# Patient Record
Sex: FEMALE | Race: WHITE | NOT HISPANIC OR LATINO | Employment: FULL TIME | URBAN - METROPOLITAN AREA
[De-identification: names, ages, dates, MRNs, and addresses within clinical notes are randomized per-mention and may not be internally consistent; named-entity substitution may affect disease eponyms.]

---

## 2017-03-29 ENCOUNTER — ALLSCRIPTS OFFICE VISIT (OUTPATIENT)
Dept: OTHER | Facility: OTHER | Age: 27
End: 2017-03-29

## 2017-03-29 DIAGNOSIS — R68.82 DECREASED LIBIDO: ICD-10-CM

## 2017-03-29 DIAGNOSIS — N76.0 ACUTE VAGINITIS: ICD-10-CM

## 2017-04-03 ENCOUNTER — HOSPITAL ENCOUNTER (OUTPATIENT)
Dept: RADIOLOGY | Facility: HOSPITAL | Age: 27
Discharge: HOME | End: 2017-04-03
Attending: OBSTETRICS & GYNECOLOGY
Payer: COMMERCIAL

## 2017-04-03 DIAGNOSIS — N94.10 DYSPAREUNIA IN FEMALE: ICD-10-CM

## 2017-04-03 DIAGNOSIS — R68.82 DECREASED LIBIDO: ICD-10-CM

## 2017-04-03 PROCEDURE — 76830 TRANSVAGINAL US NON-OB: CPT

## 2017-04-03 PROCEDURE — 76856 US EXAM PELVIC COMPLETE: CPT

## 2017-04-12 ENCOUNTER — ALLSCRIPTS OFFICE VISIT (OUTPATIENT)
Dept: OTHER | Facility: OTHER | Age: 27
End: 2017-04-12

## 2017-05-09 ENCOUNTER — ALLSCRIPTS OFFICE VISIT (OUTPATIENT)
Dept: OTHER | Facility: OTHER | Age: 27
End: 2017-05-09

## 2017-05-09 PROCEDURE — 87491 CHLMYD TRACH DNA AMP PROBE: CPT | Performed by: OBSTETRICS & GYNECOLOGY

## 2017-05-09 PROCEDURE — 87591 N.GONORRHOEAE DNA AMP PROB: CPT | Performed by: OBSTETRICS & GYNECOLOGY

## 2017-05-09 PROCEDURE — G0145 SCR C/V CYTO,THINLAYER,RESCR: HCPCS | Performed by: OBSTETRICS & GYNECOLOGY

## 2017-05-11 ENCOUNTER — LAB REQUISITION (OUTPATIENT)
Dept: LAB | Facility: HOSPITAL | Age: 27
End: 2017-05-11
Payer: COMMERCIAL

## 2017-05-11 DIAGNOSIS — Z12.4 ENCOUNTER FOR SCREENING FOR MALIGNANT NEOPLASM OF CERVIX: ICD-10-CM

## 2017-05-11 DIAGNOSIS — Z11.3 ENCOUNTER FOR SCREENING FOR INFECTIONS WITH PREDOMINANTLY SEXUAL MODE OF TRANSMISSION: ICD-10-CM

## 2017-05-19 LAB
CHLAMYDIA DNA CVX QL NAA+PROBE: NORMAL
N GONORRHOEA DNA GENITAL QL NAA+PROBE: NORMAL

## 2017-05-24 LAB
LAB AP GYN PRIMARY INTERPRETATION: NORMAL
LAB AP LMP: NORMAL
Lab: NORMAL

## 2017-08-08 ENCOUNTER — ALLSCRIPTS OFFICE VISIT (OUTPATIENT)
Dept: OTHER | Facility: OTHER | Age: 27
End: 2017-08-08

## 2017-09-07 ENCOUNTER — GENERIC CONVERSION - ENCOUNTER (OUTPATIENT)
Dept: OTHER | Facility: OTHER | Age: 27
End: 2017-09-07

## 2017-11-09 ENCOUNTER — ALLSCRIPTS OFFICE VISIT (OUTPATIENT)
Dept: OTHER | Facility: OTHER | Age: 27
End: 2017-11-09

## 2017-11-09 PROCEDURE — 87491 CHLMYD TRACH DNA AMP PROBE: CPT | Performed by: OBSTETRICS & GYNECOLOGY

## 2017-11-09 PROCEDURE — 87591 N.GONORRHOEAE DNA AMP PROB: CPT | Performed by: OBSTETRICS & GYNECOLOGY

## 2017-11-10 ENCOUNTER — LAB REQUISITION (OUTPATIENT)
Dept: LAB | Facility: HOSPITAL | Age: 27
End: 2017-11-10
Payer: COMMERCIAL

## 2017-11-10 DIAGNOSIS — Z11.3 ENCOUNTER FOR SCREENING FOR INFECTIONS WITH PREDOMINANTLY SEXUAL MODE OF TRANSMISSION: ICD-10-CM

## 2017-11-10 NOTE — PROGRESS NOTES
Assessment  1  Bacterial vaginosis (616 10,041 9) (N76 0,B96 89)   2  Screening for STD (sexually transmitted disease) (V74 5) (Z11 3)    Plan  Bacterial vaginosis    · MetroNIDAZOLE 500 MG Oral Tablet   Rx By: Roslyn Feliz; Dispense: 0 Days ; #:1 X 20 Tablet Bottle; Refill: 0;Bacterial vaginosis; KENISHA = N; Sent To: [facility] [id number]   · Cleocin 100 MG Vaginal Suppository; INSERT 1 SUPPOSITORYINTRAVAGINALLY AT BEDTIME NIGHTLY   Rx By: Roslyn Feliz; Dispense: 3 Days ; #:1 X 3 Suppository Box; Refill: 1;Bacterial vaginosis; KENISHA = N; Verified Transmission to [address] [place] [address]; Last Updated By: System, [employer]; 11/9/2017 5:04:34 PM  Screening for STD (sexually transmitted disease)    · (1) CHLAMYDIA/GC AMPLIFIED DNA, PCR; Source:Endocervical; Status:Active; Requested ABP:[id number];    Perform:[facility]; UFU:[id number]; Ordered;for STD (sexually transmitted disease); Ordered By:Riedel, Selestino [place]ers;    Discussion/Summary  Discussion Summary:   As noted above patient with recurrent bacterial vaginosis symptoms request repeat treatment with Cleocin vaginal suppositories  Wet mount positive for recurrent bacterial vaginosis  No other symptoms STD testing for GC and Chlamydia also done today  Patient declines any further laboratory studies for STDs and have been previously negative in the past    Counseling Documentation With Imm: The patient was counseled regarding diagnostic results,-- instructions for management,-- risk factor reductions,-- prognosis,-- impressions,-- risks and benefits of treatment options,-- importance of compliance with treatment,-- Recurrent bacterial vaginosis, STD screening  total time of encounter was 25 minutes-- and-- 15 minutes was spent counseling  Chief Complaint  Chief Complaint Free Text Note Form: pt is here for recheck pt states have the same symptoms of BV        History of Present Illness  HPI: 42-year-old female nulliparous here for follow-up and recurrent bacterial vaginosis symptoms  Patient has been treated with a modality of different treatments  She finds the Cleocin vaginal ovules tend to help her symptoms the best  Patient also requests STD testing today  No problems with her menstrual cycle states are regular denies any breakthrough bleeding or pelvic pain symptoms  Review of Systems  Focused-Female:  Constitutional: No fever, no chills, feels well, no tiredness, no recent weight gain or loss  ENT: no ear ache, no loss of hearing, no nosebleeds or nasal discharge, no sore throat or hoarseness  Cardiovascular: no complaints of slow or fast heart rate, no chest pain, no palpitations, no leg claudication or lower extremity edema  Respiratory: no complaints of shortness of breath, no wheezing, no dyspnea on exertion, no orthopnea or PND  Breasts: no complaints of breast pain, breast lump or nipple discharge  Gastrointestinal: no complaints of abdominal pain, no constipation, no nausea or diarrhea, no vomiting, no bloody stools  Genitourinary: no complaints of dysuria, no incontinence, no pelvic pain, no dysmenorrhea, no vaginal discharge or abnormal vaginal bleeding  Musculoskeletal: no complaints of arthralgia, no myalgia, no joint swelling or stiffness, no limb pain or swelling  Integumentary: no complaints of skin rash or lesion, no itching or dry skin, no skin wounds  Neurological: no complaints of headache, no confusion, no numbness or tingling, no dizziness or fainting  Active Problems    1  Bacterial vaginosis (616 10,041 9) (N76 0,B96 89)   2  Birth control (V25 9) (Z30 9)   3  Cervical cancer screening (V76 2) (Z12 4)   4  Decreased libido (799 81) (R68 82)   5  Dyspareunia, female (625 0) (N94 10)   6  Encounter for cervical Pap smear with pelvic exam (V76 2,V72 31) (Z01 419)   7  Screening for STD (sexually transmitted disease) (V74 5) (Z11 3)    Past Medical History  1   History of Bacterial vaginosis (616 10,041 9) (N76 0,B96 89)    Surgical History  1  History of Oral Surgery    Family History  Mother    1  Family history of High cholesterol  Father    2  Family history of Type 2 diabetes mellitus without complication    Social History     · Caffeine use (V49 89) (F15 90)   · Never a smoker   · Single   · Social alcohol use (Z78 9)    Current Meds   1  Cleocin 100 MG Vaginal Suppository; INSERT 1 SUPPOSITORY INTRAVAGINALLY AT BEDTIME NIGHTLY; Therapy: 35Edm3024 to (Evaluate:[id number])  Requested for: 75Knt4990; Last Rx:[id number] Ordered   2  MetroNIDAZOLE 500 MG Oral Tablet; one tablet twice daily for 10 days; Therapy: 16Cry9944 to (Last Rx:[id number])  Requested for: 24Pdp7744 Ordered   3  TriNessa (28) 0 18/0 215/0 25 MG-35 MCG Oral Tablet; TAKE 1 TABLET DAILY AS DIRECTED; Last Rx:[id number] Ordered    Allergies  1  No Known Drug Allergies    Vitals  Vital Signs    Recorded: 12RDO7996 79:20ZC   Systolic 826, LUE, Sitting   Diastolic 75, LUE, Sitting   Height 5 ft 5 in   Weight 112 lb    BMI Calculated 18 64   BSA Calculated 1 55   LMP [id number]       Physical Exam   Constitutional  General appearance: No acute distress, well appearing and well nourished  Genitourinary  External genitalia: Normal and no lesions appreciated  Vagina: Normal, no lesions or dryness appreciated  Cervix: Normal, no palpable masses  Uterus: Normal, non-tender, not enlarged, and no palpable masses  Adnexa/parametria: Normal, non-tender and no fullness or masses appreciated         Signatures   Electronically signed by : Rosario Hutchison DO; Nov 9 2017  5:44PM EST                       (Author)

## 2017-11-13 LAB
CHLAMYDIA DNA CVX QL NAA+PROBE: NORMAL
N GONORRHOEA DNA GENITAL QL NAA+PROBE: NORMAL

## 2018-01-12 VITALS
WEIGHT: 112 LBS | SYSTOLIC BLOOD PRESSURE: 117 MMHG | HEIGHT: 65 IN | BODY MASS INDEX: 18.66 KG/M2 | DIASTOLIC BLOOD PRESSURE: 75 MMHG

## 2018-01-12 VITALS
WEIGHT: 114 LBS | BODY MASS INDEX: 18.99 KG/M2 | HEIGHT: 65 IN | DIASTOLIC BLOOD PRESSURE: 72 MMHG | SYSTOLIC BLOOD PRESSURE: 116 MMHG

## 2018-01-13 VITALS
HEIGHT: 65 IN | SYSTOLIC BLOOD PRESSURE: 112 MMHG | BODY MASS INDEX: 18.49 KG/M2 | DIASTOLIC BLOOD PRESSURE: 65 MMHG | WEIGHT: 111 LBS

## 2018-01-13 VITALS
HEIGHT: 65 IN | BODY MASS INDEX: 18.83 KG/M2 | WEIGHT: 113 LBS | DIASTOLIC BLOOD PRESSURE: 60 MMHG | SYSTOLIC BLOOD PRESSURE: 112 MMHG

## 2018-01-14 VITALS
BODY MASS INDEX: 18.83 KG/M2 | WEIGHT: 113 LBS | DIASTOLIC BLOOD PRESSURE: 70 MMHG | HEIGHT: 65 IN | SYSTOLIC BLOOD PRESSURE: 112 MMHG

## 2018-01-22 VITALS
WEIGHT: 113.38 LBS | BODY MASS INDEX: 18.89 KG/M2 | SYSTOLIC BLOOD PRESSURE: 120 MMHG | HEIGHT: 65 IN | DIASTOLIC BLOOD PRESSURE: 80 MMHG

## 2018-04-25 DIAGNOSIS — Z30.41 ENCOUNTER FOR SURVEILLANCE OF CONTRACEPTIVE PILLS: Primary | ICD-10-CM

## 2018-04-26 RX ORDER — NORGESTIMATE-ETHINYL ESTRADIOL 7DAYSX3 28
TABLET ORAL
Qty: 28 TABLET | Refills: 0 | Status: SHIPPED | OUTPATIENT
Start: 2018-04-26 | End: 2018-05-16 | Stop reason: SDUPTHER

## 2018-05-16 ENCOUNTER — ANNUAL EXAM (OUTPATIENT)
Dept: OBGYN CLINIC | Facility: CLINIC | Age: 28
End: 2018-05-16
Payer: COMMERCIAL

## 2018-05-16 VITALS
BODY MASS INDEX: 21.64 KG/M2 | SYSTOLIC BLOOD PRESSURE: 112 MMHG | DIASTOLIC BLOOD PRESSURE: 82 MMHG | HEIGHT: 60 IN | WEIGHT: 110.2 LBS

## 2018-05-16 DIAGNOSIS — Z30.41 ENCOUNTER FOR SURVEILLANCE OF CONTRACEPTIVE PILLS: ICD-10-CM

## 2018-05-16 DIAGNOSIS — B96.89 BV (BACTERIAL VAGINOSIS): Primary | ICD-10-CM

## 2018-05-16 DIAGNOSIS — N89.8 VAGINAL DISCHARGE: ICD-10-CM

## 2018-05-16 DIAGNOSIS — N76.0 BV (BACTERIAL VAGINOSIS): Primary | ICD-10-CM

## 2018-05-16 DIAGNOSIS — Z01.419 ENCOUNTER FOR ANNUAL ROUTINE GYNECOLOGICAL EXAMINATION: ICD-10-CM

## 2018-05-16 DIAGNOSIS — Z11.3 SCREENING FOR STDS (SEXUALLY TRANSMITTED DISEASES): ICD-10-CM

## 2018-05-16 PROCEDURE — 87210 SMEAR WET MOUNT SALINE/INK: CPT | Performed by: OBSTETRICS & GYNECOLOGY

## 2018-05-16 PROCEDURE — 87591 N.GONORRHOEAE DNA AMP PROB: CPT | Performed by: OBSTETRICS & GYNECOLOGY

## 2018-05-16 PROCEDURE — S0612 ANNUAL GYNECOLOGICAL EXAMINA: HCPCS | Performed by: OBSTETRICS & GYNECOLOGY

## 2018-05-16 PROCEDURE — G0145 SCR C/V CYTO,THINLAYER,RESCR: HCPCS | Performed by: OBSTETRICS & GYNECOLOGY

## 2018-05-16 PROCEDURE — 87491 CHLMYD TRACH DNA AMP PROBE: CPT | Performed by: OBSTETRICS & GYNECOLOGY

## 2018-05-16 RX ORDER — METRONIDAZOLE 500 MG/1
500 TABLET ORAL EVERY 12 HOURS SCHEDULED
Qty: 14 TABLET | Refills: 0 | Status: SHIPPED | OUTPATIENT
Start: 2018-05-16 | End: 2018-05-23

## 2018-05-16 RX ORDER — NORGESTIMATE AND ETHINYL ESTRADIOL 7DAYSX3 28
1 KIT ORAL DAILY
Qty: 28 TABLET | Refills: 11 | Status: SHIPPED | OUTPATIENT
Start: 2018-05-16 | End: 2019-05-03 | Stop reason: SDUPTHER

## 2018-05-16 RX ORDER — LORATADINE 10 MG/1
10 TABLET ORAL DAILY
COMMUNITY

## 2018-05-16 RX ORDER — MELATONIN 10 MG
TABLET, SUBLINGUAL SUBLINGUAL
COMMUNITY
End: 2019-06-06

## 2018-05-16 NOTE — PROGRESS NOTES
Assessment   Annual well-woman exam  Recurrent bacterial vaginosis         Plan   Metronidazole for recurrent BV  Renew OCPs  Follow-up in 1 year and p r n  All questions answered  Await pap smear results  Subjective here for annual exam, recurrent vaginal discharge     Sarah Hall is a 29 y o  female who presents for annual exam  Periods are regular every 28-30 days, lasting 5 days  Dysmenorrhea:none  Cyclic symptoms include none  No intermenstrual bleeding, spotting, or discharge  The patient reports that there is not domestic violence in her life  Current contraception: OCP (estrogen/progesterone)  History of abnormal Pap smear: no  Family history of uterine or ovarian cancer: no  Regular self breast exam: yes  History of abnormal mammogram: no  Family history of breast cancer: no  History of abnormal lipids: no  Menstrual History:  OB History     No data available         Menarche age: 15  Patient's last menstrual period was 05/02/2018 (exact date)  Review of Systems  Pertinent items are noted in HPI  Objective no acute distress     /82 (BP Location: Left arm, Patient Position: Sitting, Cuff Size: Adult)   Ht 5' (1 524 m)   Wt 50 kg (110 lb 3 2 oz)   LMP 05/02/2018 (Exact Date)   Breastfeeding?  No   BMI 21 52 kg/m²     General:   alert and oriented, in no acute distress, alert, appears stated age and cooperative   Heart: regular rate and rhythm, S1, S2 normal, no murmur, click, rub or gallop   Lungs: clear to auscultation bilaterally   Abdomen: soft, non-tender, without masses or organomegaly   Vulva: normal, Bartholin's, Urethra, [place]'s normal, female escutcheon   Vagina: normal mucosa, normal discharge   Cervix: no bleeding following Pap, no cervical motion tenderness, no lesions and nulliparous appearance   Wet mount positive for clue cells consistent with recurrent BV    Uterus: normal size, mobile, non-tender, normal shape and consistency   Adnexa: normal adnexa   Breast exam bilateral breast exam in the sitting and supine position no visible or palpable breast lesions or masses identified no supraclavicular axillary lymphadenopathy noted

## 2018-05-18 LAB
CHLAMYDIA DNA CVX QL NAA+PROBE: NORMAL
N GONORRHOEA DNA GENITAL QL NAA+PROBE: NORMAL

## 2018-05-23 LAB
LAB AP GYN PRIMARY INTERPRETATION: NORMAL
Lab: NORMAL

## 2018-05-29 DIAGNOSIS — Z30.41 ENCOUNTER FOR SURVEILLANCE OF CONTRACEPTIVE PILLS: ICD-10-CM

## 2018-05-29 RX ORDER — NORGESTIMATE-ETHINYL ESTRADIOL 7DAYSX3 28
TABLET ORAL
Qty: 28 TABLET | Refills: 11 | Status: SHIPPED | OUTPATIENT
Start: 2018-05-29 | End: 2018-07-05 | Stop reason: SDUPTHER

## 2018-07-05 ENCOUNTER — OFFICE VISIT (OUTPATIENT)
Dept: OBGYN CLINIC | Facility: CLINIC | Age: 28
End: 2018-07-05
Payer: COMMERCIAL

## 2018-07-05 VITALS
DIASTOLIC BLOOD PRESSURE: 84 MMHG | WEIGHT: 110.6 LBS | BODY MASS INDEX: 21.71 KG/M2 | HEIGHT: 60 IN | SYSTOLIC BLOOD PRESSURE: 136 MMHG

## 2018-07-05 DIAGNOSIS — B96.89 BV (BACTERIAL VAGINOSIS): Primary | ICD-10-CM

## 2018-07-05 DIAGNOSIS — N76.0 BV (BACTERIAL VAGINOSIS): Primary | ICD-10-CM

## 2018-07-05 DIAGNOSIS — Z11.3 SCREENING FOR STDS (SEXUALLY TRANSMITTED DISEASES): ICD-10-CM

## 2018-07-05 DIAGNOSIS — B00.9 HERPES INFECTION: ICD-10-CM

## 2018-07-05 DIAGNOSIS — N89.8 VAGINAL DISCHARGE: ICD-10-CM

## 2018-07-05 PROCEDURE — 87255 GENET VIRUS ISOLATE HSV: CPT | Performed by: OBSTETRICS & GYNECOLOGY

## 2018-07-05 PROCEDURE — 99214 OFFICE O/P EST MOD 30 MIN: CPT | Performed by: OBSTETRICS & GYNECOLOGY

## 2018-07-05 RX ORDER — CLINDAMYCIN HYDROCHLORIDE 300 MG/1
300 CAPSULE ORAL 2 TIMES DAILY
Qty: 14 CAPSULE | Refills: 0 | Status: SHIPPED | OUTPATIENT
Start: 2018-07-05 | End: 2018-07-12

## 2018-07-05 RX ORDER — VALACYCLOVIR HYDROCHLORIDE 1 G/1
1000 TABLET, FILM COATED ORAL 2 TIMES DAILY
Qty: 20 TABLET | Refills: 0 | Status: SHIPPED | OUTPATIENT
Start: 2018-07-05 | End: 2018-07-18 | Stop reason: DRUGHIGH

## 2018-07-05 RX ORDER — ACYCLOVIR 50 MG/G
OINTMENT TOPICAL 3 TIMES DAILY PRN
Qty: 15 G | Refills: 1 | Status: SHIPPED | OUTPATIENT
Start: 2018-07-05 | End: 2021-06-04

## 2018-07-05 RX ORDER — BIOTIN 1 MG
1000 TABLET ORAL 3 TIMES DAILY
COMMUNITY

## 2018-07-05 RX ORDER — METRONIDAZOLE 7.5 MG/G
1 GEL VAGINAL
Qty: 5 G | Refills: 0 | Status: SHIPPED | OUTPATIENT
Start: 2018-07-05 | End: 2018-07-05 | Stop reason: CLARIF

## 2018-07-05 RX ORDER — VALACYCLOVIR HYDROCHLORIDE 500 MG/1
500 TABLET, FILM COATED ORAL DAILY
Qty: 30 TABLET | Refills: 3 | Status: SHIPPED | OUTPATIENT
Start: 2018-07-05 | End: 2018-10-22 | Stop reason: SDUPTHER

## 2018-07-05 NOTE — PROGRESS NOTES
Assessment/Plan:    Diagnoses and all orders for this visit:    BV (bacterial vaginosis)  -     Discontinue: metroNIDAZOLE (METROGEL) 0 75 % vaginal gel; Insert 1 application into the vagina daily at bedtime for 5 days  -     clindamycin (CLEOCIN) 300 MG capsule; Take 1 capsule (300 mg total) by mouth 2 (two) times a day for 7 days    Vaginal discharge    Herpes infection  -     acyclovir (ZOVIRAX) 5 % ointment; Apply topically 3 (three) times a day as needed (As needed) for up to 5 days  -     valACYclovir (VALTREX) 1,000 mg tablet; Take 1 tablet (1,000 mg total) by mouth 2 (two) times a day for 10 days  -     valACYclovir (VALTREX) 500 mg tablet; Take 1 tablet (500 mg total) by mouth daily for 30 days  -     Herpes I/II IgG Antibodies; Future    Screening for STDs (sexually transmitted diseases)  -     HIV 1/2 AG-AB combo; Future  -     RPR; Future  -     Hepatitis B surface antigen; Future  -     Hepatitis C antibody; Future    Other orders  -     Biotin 1000 MCG tablet; Take 1,000 mcg by mouth 3 (three) times a day        Subjective:  Vaginal discharge and painful labial sores with ulceration   Patient ID: Yahir Mullins is a 29 y o  female  HPI   45-year-old female nulliparous here for evaluation regarding recurrent vaginal discharge and painful labial ulcerations on her labia minora  Patient states she has not had sexual relations since April this past year was recently here in May for annual exam   Her Pap at that time was normal GC and chlamydia cultures were negative  On exam today painful vaginal ulcerative lesions noted cultures taken  Antibody testing ordered  Return in 2 weeks for follow-up    Review of Systems   Genitourinary: Positive for genital sores, vaginal discharge and vaginal pain  All other systems reviewed and are negative     Objective:  Painful genital lesion as noted above     Physical Exam   Constitutional: She is oriented to person, place, and time   She appears well-developed and well-nourished  HENT:   Head: Normocephalic  Eyes: Pupils are equal, round, and reactive to light  Neck: Normal range of motion  Neck supple  Genitourinary: Vaginal discharge found  Genitourinary Comments: Pelvic exam  Normal external genitalia  Multiple ulcerative lesions on labia minora at introitus near clitoris and her urethra  Cultures done for herpes  Bimanual pelvic exam  Normal size uterus  No CMT  No adnexal masses  Wet mount positive for clue cells   Musculoskeletal: Normal range of motion  Neurological: She is alert and oriented to person, place, and time  Skin: Skin is warm and dry  Psychiatric: She has a normal mood and affect   Her behavior is normal  Judgment and thought content normal

## 2018-07-06 ENCOUNTER — APPOINTMENT (OUTPATIENT)
Dept: LAB | Facility: MEDICAL CENTER | Age: 28
End: 2018-07-06
Payer: COMMERCIAL

## 2018-07-06 ENCOUNTER — TRANSCRIBE ORDERS (OUTPATIENT)
Dept: ADMINISTRATIVE | Facility: HOSPITAL | Age: 28
End: 2018-07-06

## 2018-07-06 DIAGNOSIS — Z11.3 SCREENING FOR STDS (SEXUALLY TRANSMITTED DISEASES): ICD-10-CM

## 2018-07-06 DIAGNOSIS — B00.9 HERPES INFECTION: ICD-10-CM

## 2018-07-06 PROCEDURE — 86803 HEPATITIS C AB TEST: CPT

## 2018-07-06 PROCEDURE — 86592 SYPHILIS TEST NON-TREP QUAL: CPT

## 2018-07-06 PROCEDURE — 36415 COLL VENOUS BLD VENIPUNCTURE: CPT

## 2018-07-06 PROCEDURE — 86696 HERPES SIMPLEX TYPE 2 TEST: CPT

## 2018-07-06 PROCEDURE — 86695 HERPES SIMPLEX TYPE 1 TEST: CPT

## 2018-07-06 PROCEDURE — 87340 HEPATITIS B SURFACE AG IA: CPT

## 2018-07-06 PROCEDURE — 87389 HIV-1 AG W/HIV-1&-2 AB AG IA: CPT

## 2018-07-07 LAB
HBV SURFACE AG SER QL: NORMAL
HCV AB SER QL: NORMAL
HSV1 IGG SER IA-ACNC: <0.91 INDEX (ref 0–0.9)
HSV2 IGG SER IA-ACNC: <0.91 INDEX (ref 0–0.9)

## 2018-07-09 LAB
HIV 1+2 AB+HIV1 P24 AG SERPL QL IA: NORMAL
RPR SER QL: NORMAL

## 2018-07-10 LAB — HSV SPEC CULT: ABNORMAL

## 2018-07-18 ENCOUNTER — OFFICE VISIT (OUTPATIENT)
Dept: OBGYN CLINIC | Facility: CLINIC | Age: 28
End: 2018-07-18
Payer: COMMERCIAL

## 2018-07-18 VITALS
HEIGHT: 61 IN | SYSTOLIC BLOOD PRESSURE: 114 MMHG | BODY MASS INDEX: 21.56 KG/M2 | WEIGHT: 114.2 LBS | DIASTOLIC BLOOD PRESSURE: 76 MMHG

## 2018-07-18 DIAGNOSIS — B96.89 BV (BACTERIAL VAGINOSIS): Primary | ICD-10-CM

## 2018-07-18 DIAGNOSIS — A60.00 HERPES SIMPLEX INFECTION OF GENITOURINARY SYSTEM: ICD-10-CM

## 2018-07-18 DIAGNOSIS — N76.0 BV (BACTERIAL VAGINOSIS): Primary | ICD-10-CM

## 2018-07-18 PROCEDURE — 99214 OFFICE O/P EST MOD 30 MIN: CPT | Performed by: OBSTETRICS & GYNECOLOGY

## 2018-07-18 PROCEDURE — 87210 SMEAR WET MOUNT SALINE/INK: CPT | Performed by: OBSTETRICS & GYNECOLOGY

## 2018-07-18 RX ORDER — METRONIDAZOLE 7.5 MG/G
1 GEL VAGINAL
Qty: 5 G | Refills: 1 | Status: SHIPPED | OUTPATIENT
Start: 2018-07-18 | End: 2018-07-23

## 2018-07-18 RX ORDER — CLINDAMYCIN HYDROCHLORIDE 150 MG/1
300 CAPSULE ORAL EVERY 6 HOURS SCHEDULED
COMMUNITY
End: 2018-10-22 | Stop reason: SDUPTHER

## 2018-07-18 NOTE — PROGRESS NOTES
Assessment/Plan:    Diagnoses and all orders for this visit:    BV (bacterial vaginosis)  -     metroNIDAZOLE (METROGEL) 0 75 % vaginal gel; Insert 1 application into the vagina daily at bedtime for 5 days    Herpes simplex infection of genitourinary system    Other orders  -     clindamycin (CLEOCIN) 150 mg capsule; Take 300 mg by mouth every 6 (six) hours      Subjective:  Vaginal discharge     Patient ID: Hyacinth Jordan is a 29 y o  female  HPI   49-year-old female seen here 2 weeks ago with ulcerative type lesion on her vulva and left inguinal lymphadenopathy with tenderness and pain  Interestingly herpes culture came back positive however herpes antibodies were negative for type 1 or type 2  Symptomatic the patient improved with the valacyclovir treatment 1000 mg twice daily for 10 days followed by 500 mg once daily  She also used acyclovir ointment  The ulcerative lesion resolve completely the left inguinal adenopathy improved and went away  She does however complain of recurrent vaginal    Review of Systems   Genitourinary: Positive for vaginal discharge  All other systems reviewed and are negative  discharge consistent with recurrent    Objective:  No acute distress     Physical Exam   Constitutional: She is oriented to person, place, and time  She appears well-developed and well-nourished  HENT:   Head: Normocephalic and atraumatic  Eyes: Pupils are equal, round, and reactive to light  Neck: Normal range of motion  Genitourinary: Vagina normal and uterus normal    Genitourinary Comments: Pelvic exam  Normal external genitalia  No visible lesions on her vulva or labia  Normal size uterus anteverted  No adnexal masses  No CMT  Wet mount positive for clue cells consistent with recurrent bacterial vaginosis  Musculoskeletal: Normal range of motion  Neurological: She is alert and oriented to person, place, and time  Skin: Skin is warm and dry     Psychiatric: She has a normal mood and affect  Plan  Recommend continuing vessel I clear 500 mg daily for the next  3-6 months as suppressive therapy      Follow-up here in 3 months to review symptoms reorder antibody testing  Treatment for recurrent bacterial vaginosis with Metrogel

## 2018-10-22 ENCOUNTER — OFFICE VISIT (OUTPATIENT)
Dept: OBGYN CLINIC | Facility: CLINIC | Age: 28
End: 2018-10-22
Payer: COMMERCIAL

## 2018-10-22 VITALS
DIASTOLIC BLOOD PRESSURE: 84 MMHG | BODY MASS INDEX: 19.26 KG/M2 | SYSTOLIC BLOOD PRESSURE: 126 MMHG | WEIGHT: 115.6 LBS | HEIGHT: 65 IN

## 2018-10-22 DIAGNOSIS — N89.8 VAGINAL DISCHARGE: ICD-10-CM

## 2018-10-22 DIAGNOSIS — N76.0 BV (BACTERIAL VAGINOSIS): Primary | ICD-10-CM

## 2018-10-22 DIAGNOSIS — B00.9 HERPES INFECTION: ICD-10-CM

## 2018-10-22 DIAGNOSIS — B96.89 BV (BACTERIAL VAGINOSIS): Primary | ICD-10-CM

## 2018-10-22 PROCEDURE — 99214 OFFICE O/P EST MOD 30 MIN: CPT | Performed by: OBSTETRICS & GYNECOLOGY

## 2018-10-22 PROCEDURE — 57150 TREAT VAGINA INFECTION: CPT | Performed by: OBSTETRICS & GYNECOLOGY

## 2018-10-22 RX ORDER — VALACYCLOVIR HYDROCHLORIDE 500 MG/1
TABLET, FILM COATED ORAL
Refills: 3 | COMMUNITY
Start: 2018-09-08 | End: 2019-06-06 | Stop reason: ALTCHOICE

## 2018-10-22 RX ORDER — VALACYCLOVIR HYDROCHLORIDE 500 MG/1
500 TABLET, FILM COATED ORAL DAILY
Qty: 30 TABLET | Refills: 6 | Status: SHIPPED | OUTPATIENT
Start: 2018-10-22 | End: 2019-06-06 | Stop reason: ALTCHOICE

## 2018-10-22 RX ORDER — CLINDAMYCIN HYDROCHLORIDE 150 MG/1
300 CAPSULE ORAL 2 TIMES DAILY
Qty: 14 CAPSULE | Refills: 0 | Status: SHIPPED | OUTPATIENT
Start: 2018-10-22 | End: 2018-10-29

## 2018-10-22 NOTE — PROGRESS NOTES
Assessment/Plan:    Diagnoses and all orders for this visit:    BV (bacterial vaginosis)  -     clindamycin (CLEOCIN) 150 mg capsule; Take 2 capsules (300 mg total) by mouth 2 (two) times a day for 7 days    Vaginal discharge    Herpes infection  -     valACYclovir (VALTREX) 500 mg tablet; Take 1 tablet (500 mg total) by mouth daily for 30 days    Other orders  -     valACYclovir (VALTREX) 500 mg tablet;         Subjective:  Recurrent bacterial vaginosis     Patient ID: Bryce Colón is a 29 y o  female  HPI   29year old female nulliparous complains of recurrent BV symptoms with discharge and odor  Last seen here this past summer with similar complaints  She also has remote history of herpes infection which improved with valacyclovir currently is maintained on valacyclovir 500 mg 1 tablet daily  She has not had any further outbreaks recently  She was given the option to stop suppressive treatment however she would like to continue at this time  She is on birth control Havery Gayatri would like to continue this as well  No other gyn complaints or concerns  She is not currently sexually active    Review of Systems   Genitourinary: Positive for vaginal discharge  Negative for dyspareunia, menstrual problem, pelvic pain and vaginal pain  All other systems reviewed and are negative   Objective:   Blood pressure 126/84, height 5 foot 5 inches weight 115 lb     Physical Exam   Constitutional: She is oriented to person, place, and time  She appears well-developed and well-nourished  HENT:   Head: Normocephalic  Eyes: Pupils are equal, round, and reactive to light  Neck: Normal range of motion  Genitourinary:   Genitourinary Comments: Pelvic exam  Normal external genitalia  Normal size uterus  No adnexal masses  No CMT  Wet mount positive for clue cells   Musculoskeletal: Normal range of motion  Neurological: She is alert and oriented to person, place, and time  Skin: Skin is warm and dry  Psychiatric: She has a normal mood and affect   Her behavior is normal  Judgment and thought content normal

## 2018-11-09 DIAGNOSIS — B00.9 HERPES INFECTION: ICD-10-CM

## 2018-11-09 RX ORDER — VALACYCLOVIR HYDROCHLORIDE 500 MG/1
500 TABLET, FILM COATED ORAL DAILY
Qty: 30 TABLET | Refills: 6 | Status: SHIPPED | OUTPATIENT
Start: 2018-11-09 | End: 2019-06-06 | Stop reason: ALTCHOICE

## 2019-05-03 DIAGNOSIS — Z30.41 ENCOUNTER FOR SURVEILLANCE OF CONTRACEPTIVE PILLS: ICD-10-CM

## 2019-05-05 RX ORDER — NORGESTIMATE AND ETHINYL ESTRADIOL 7DAYSX3 28
1 KIT ORAL DAILY
Qty: 28 TABLET | Refills: 11 | Status: SHIPPED | OUTPATIENT
Start: 2019-05-05 | End: 2019-06-24 | Stop reason: SDUPTHER

## 2019-06-06 ENCOUNTER — ANNUAL EXAM (OUTPATIENT)
Dept: OBGYN CLINIC | Facility: CLINIC | Age: 29
End: 2019-06-06
Payer: COMMERCIAL

## 2019-06-06 VITALS
BODY MASS INDEX: 19.56 KG/M2 | DIASTOLIC BLOOD PRESSURE: 80 MMHG | SYSTOLIC BLOOD PRESSURE: 120 MMHG | WEIGHT: 117.4 LBS | HEIGHT: 65 IN

## 2019-06-06 DIAGNOSIS — Z01.419 WOMEN'S ANNUAL ROUTINE GYNECOLOGICAL EXAMINATION: ICD-10-CM

## 2019-06-06 DIAGNOSIS — N89.8 VAGINAL DISCHARGE: ICD-10-CM

## 2019-06-06 DIAGNOSIS — B96.89 BV (BACTERIAL VAGINOSIS): Primary | ICD-10-CM

## 2019-06-06 DIAGNOSIS — Z30.41 ENCOUNTER FOR SURVEILLANCE OF CONTRACEPTIVE PILLS: ICD-10-CM

## 2019-06-06 DIAGNOSIS — N76.0 BV (BACTERIAL VAGINOSIS): Primary | ICD-10-CM

## 2019-06-06 PROCEDURE — 87210 SMEAR WET MOUNT SALINE/INK: CPT | Performed by: OBSTETRICS & GYNECOLOGY

## 2019-06-06 PROCEDURE — S0612 ANNUAL GYNECOLOGICAL EXAMINA: HCPCS | Performed by: OBSTETRICS & GYNECOLOGY

## 2019-06-06 PROCEDURE — G0145 SCR C/V CYTO,THINLAYER,RESCR: HCPCS | Performed by: OBSTETRICS & GYNECOLOGY

## 2019-06-06 RX ORDER — METRONIDAZOLE 500 MG/1
500 TABLET ORAL EVERY 12 HOURS SCHEDULED
Qty: 14 TABLET | Refills: 0 | Status: SHIPPED | OUTPATIENT
Start: 2019-06-06 | End: 2019-06-13

## 2019-06-13 LAB
LAB AP GYN PRIMARY INTERPRETATION: NORMAL
LAB AP LMP: NORMAL
Lab: NORMAL

## 2019-06-24 DIAGNOSIS — Z30.41 ENCOUNTER FOR SURVEILLANCE OF CONTRACEPTIVE PILLS: ICD-10-CM

## 2019-06-24 RX ORDER — NORGESTIMATE AND ETHINYL ESTRADIOL 7DAYSX3 28
1 KIT ORAL DAILY
Qty: 28 TABLET | Refills: 11 | Status: SHIPPED | OUTPATIENT
Start: 2019-06-24 | End: 2020-06-30 | Stop reason: CLARIF

## 2020-06-30 ENCOUNTER — ANNUAL EXAM (OUTPATIENT)
Dept: OBGYN CLINIC | Facility: CLINIC | Age: 30
End: 2020-06-30
Payer: COMMERCIAL

## 2020-06-30 VITALS
BODY MASS INDEX: 19.39 KG/M2 | DIASTOLIC BLOOD PRESSURE: 80 MMHG | SYSTOLIC BLOOD PRESSURE: 160 MMHG | WEIGHT: 113.6 LBS | HEIGHT: 64 IN

## 2020-06-30 DIAGNOSIS — Z01.419 WOMEN'S ANNUAL ROUTINE GYNECOLOGICAL EXAMINATION: ICD-10-CM

## 2020-06-30 DIAGNOSIS — Z11.3 SCREENING FOR STD (SEXUALLY TRANSMITTED DISEASE): Primary | ICD-10-CM

## 2020-06-30 DIAGNOSIS — Z30.41 ENCOUNTER FOR SURVEILLANCE OF CONTRACEPTIVE PILLS: ICD-10-CM

## 2020-06-30 PROCEDURE — G0145 SCR C/V CYTO,THINLAYER,RESCR: HCPCS | Performed by: OBSTETRICS & GYNECOLOGY

## 2020-06-30 PROCEDURE — S0612 ANNUAL GYNECOLOGICAL EXAMINA: HCPCS | Performed by: OBSTETRICS & GYNECOLOGY

## 2020-06-30 PROCEDURE — 87624 HPV HI-RISK TYP POOLED RSLT: CPT | Performed by: OBSTETRICS & GYNECOLOGY

## 2020-06-30 RX ORDER — LANSOPRAZOLE 30 MG/1
CAPSULE, DELAYED RELEASE ORAL
COMMUNITY
Start: 2020-06-19 | End: 2021-06-04

## 2020-06-30 RX ORDER — HYOSCYAMINE SULFATE EXTENDED-RELEASE 0.38 MG/1
TABLET ORAL
COMMUNITY
Start: 2020-04-28

## 2020-06-30 RX ORDER — FERROUS SULFATE 325(65) MG
65 TABLET ORAL 3 TIMES WEEKLY
COMMUNITY

## 2020-06-30 RX ORDER — NORGESTIMATE AND ETHINYL ESTRADIOL
KIT
COMMUNITY
Start: 2020-06-05 | End: 2020-06-30 | Stop reason: SDUPTHER

## 2020-06-30 RX ORDER — NORGESTIMATE AND ETHINYL ESTRADIOL
1 KIT DAILY
Qty: 84 TABLET | Refills: 3 | Status: SHIPPED | OUTPATIENT
Start: 2020-06-30 | End: 2021-06-03 | Stop reason: SDUPTHER

## 2020-06-30 RX ORDER — NICOTINE POLACRILEX 2 MG
GUM BUCCAL
COMMUNITY
End: 2021-06-04

## 2020-06-30 RX ORDER — DIPHENOXYLATE HYDROCHLORIDE AND ATROPINE SULFATE 2.5; .025 MG/1; MG/1
1 TABLET ORAL DAILY
COMMUNITY
End: 2021-06-04

## 2020-07-03 LAB
HPV HR 12 DNA CVX QL NAA+PROBE: NEGATIVE
HPV16 DNA CVX QL NAA+PROBE: NEGATIVE
HPV18 DNA CVX QL NAA+PROBE: NEGATIVE

## 2020-07-07 LAB
LAB AP GYN PRIMARY INTERPRETATION: NORMAL
LAB AP LMP: NORMAL
Lab: NORMAL

## 2020-07-13 LAB
HSV1 IGG SER IA-ACNC: <0.9 INDEX
HSV2 IGG SER IA-ACNC: <0.9 INDEX

## 2020-09-08 ENCOUNTER — OFFICE VISIT (OUTPATIENT)
Dept: OBGYN CLINIC | Facility: CLINIC | Age: 30
End: 2020-09-08
Payer: COMMERCIAL

## 2020-09-08 ENCOUNTER — TELEPHONE (OUTPATIENT)
Dept: OBGYN CLINIC | Facility: CLINIC | Age: 30
End: 2020-09-08

## 2020-09-08 VITALS
TEMPERATURE: 98 F | HEIGHT: 64 IN | DIASTOLIC BLOOD PRESSURE: 78 MMHG | BODY MASS INDEX: 18.78 KG/M2 | SYSTOLIC BLOOD PRESSURE: 134 MMHG | WEIGHT: 110 LBS

## 2020-09-08 DIAGNOSIS — B96.89 BV (BACTERIAL VAGINOSIS): ICD-10-CM

## 2020-09-08 DIAGNOSIS — N76.0 BV (BACTERIAL VAGINOSIS): ICD-10-CM

## 2020-09-08 DIAGNOSIS — N89.8 VAGINAL DISCHARGE: ICD-10-CM

## 2020-09-08 DIAGNOSIS — Z11.3 SCREENING FOR STD (SEXUALLY TRANSMITTED DISEASE): Primary | ICD-10-CM

## 2020-09-08 PROCEDURE — 87491 CHLMYD TRACH DNA AMP PROBE: CPT | Performed by: OBSTETRICS & GYNECOLOGY

## 2020-09-08 PROCEDURE — 87591 N.GONORRHOEAE DNA AMP PROB: CPT | Performed by: OBSTETRICS & GYNECOLOGY

## 2020-09-08 PROCEDURE — 99214 OFFICE O/P EST MOD 30 MIN: CPT | Performed by: OBSTETRICS & GYNECOLOGY

## 2020-09-08 RX ORDER — CLOTRIMAZOLE AND BETAMETHASONE DIPROPIONATE 10; .64 MG/G; MG/G
CREAM TOPICAL 2 TIMES DAILY
Qty: 30 G | Refills: 0 | Status: SHIPPED | OUTPATIENT
Start: 2020-09-08 | End: 2021-06-04

## 2020-09-08 RX ORDER — METRONIDAZOLE 500 MG/1
500 TABLET ORAL EVERY 12 HOURS SCHEDULED
Qty: 14 TABLET | Refills: 0 | Status: SHIPPED | OUTPATIENT
Start: 2020-09-08 | End: 2020-09-15

## 2020-09-08 NOTE — TELEPHONE ENCOUNTER
Patient called c/o vaginal itching, irritation, redness, x 5 days  Tried OTC with no improvement  Had new partner  Wants appointment today

## 2020-09-09 NOTE — PROGRESS NOTES
Assessment/Plan:    Diagnoses and all orders for this visit:    Screening for STD (sexually transmitted disease)  -     Chlamydia/GC amplified DNA by PCR    Vaginal discharge  -     clotrimazole-betamethasone (LOTRISONE) 1-0 05 % cream; Apply topically 2 (two) times a day    BV (bacterial vaginosis)  -     metroNIDAZOLE (FLAGYL) 500 mg tablet; Take 1 tablet (500 mg total) by mouth every 12 (twelve) hours for 7 days        Subjective:  Vaginal discharge     Patient ID: Elaine Red is a 27 y o  female  HPI   35-year-old female nulliparous here with recurrent vaginal discharge and irritation  She has been treated for bacterial vaginosis on many occasions over the past year  Recently she had stop sexual activity in her infections had D managed  She was also using a probiotic which she felt made her feel better  Recently she began a new relationship and resume sexual activity and almost immediately began having symptoms of vaginal discharge and irritation  Patient request STD testing at this time as well  She declines additional testing which would require a blood draw only agrees to [facility] and chlamydia swab test and wet mount  Review of Systems   Respiratory: Negative  Cardiovascular: Negative  Gastrointestinal: Negative  Genitourinary: Positive for vaginal discharge  Negative for dyspareunia and pelvic pain  Neurological: Negative  Psychiatric/Behavioral: Negative  All other systems reviewed and are negative  Objective:  No acute distress  /78 (BP Location: Right arm, Patient Position: Sitting, Cuff Size: Standard)   Temp 98 °F (36 7 °C)   Ht 5' 4" (1 626 m)   Wt 49 9 kg (110 lb)   LMP 08/25/2020 (Approximate)   BMI 18 88 kg/m²      Physical Exam  Vitals signs reviewed  Exam conducted with a chaperone present  Constitutional:       Appearance: Normal appearance  She is normal weight  Eyes:      Pupils: Pupils are equal, round, and reactive to light     Pulmonary: Effort: Pulmonary effort is normal       Breath sounds: Normal breath sounds  Abdominal:      General: Abdomen is flat  Palpations: Abdomen is soft  Genitourinary:     General: Normal vulva  Comments: Normal external genitalia  Normal size uterus  Normal cervix  No CMT  No pelvic masses  Yellow white vaginal discharge  Wet mount positive for clue cells  Cultures taken for gonorrhea chlamydia  Musculoskeletal: Normal range of motion  Skin:     General: Skin is warm  Neurological:      Mental Status: She is alert and oriented to person, place, and time     Psychiatric:         Mood and Affect: Mood normal          Behavior: Behavior normal

## 2020-09-10 LAB
C TRACH DNA SPEC QL NAA+PROBE: NEGATIVE
N GONORRHOEA DNA SPEC QL NAA+PROBE: NEGATIVE

## 2021-02-04 ENCOUNTER — TELEPHONE (OUTPATIENT)
Dept: OBGYN CLINIC | Facility: CLINIC | Age: 31
End: 2021-02-04

## 2021-04-05 DIAGNOSIS — Z23 ENCOUNTER FOR IMMUNIZATION: ICD-10-CM

## 2021-06-03 DIAGNOSIS — Z30.41 ENCOUNTER FOR SURVEILLANCE OF CONTRACEPTIVE PILLS: ICD-10-CM

## 2021-06-03 DIAGNOSIS — Z01.419 WOMEN'S ANNUAL ROUTINE GYNECOLOGICAL EXAMINATION: ICD-10-CM

## 2021-06-03 RX ORDER — NORGESTIMATE AND ETHINYL ESTRADIOL
1 KIT DAILY
Qty: 84 TABLET | Refills: 0 | Status: SHIPPED | OUTPATIENT
Start: 2021-06-03 | End: 2021-06-09

## 2021-06-04 ENCOUNTER — OFFICE VISIT (OUTPATIENT)
Dept: OBGYN CLINIC | Facility: HOSPITAL | Age: 31
End: 2021-06-04
Payer: COMMERCIAL

## 2021-06-04 ENCOUNTER — HOSPITAL ENCOUNTER (OUTPATIENT)
Dept: RADIOLOGY | Facility: HOSPITAL | Age: 31
Discharge: HOME | End: 2021-06-04
Attending: ORTHOPAEDIC SURGERY
Payer: COMMERCIAL

## 2021-06-04 VITALS
WEIGHT: 113.4 LBS | DIASTOLIC BLOOD PRESSURE: 95 MMHG | BODY MASS INDEX: 21.41 KG/M2 | SYSTOLIC BLOOD PRESSURE: 143 MMHG | HEIGHT: 61 IN | HEART RATE: 98 BPM

## 2021-06-04 DIAGNOSIS — M25.531 PAIN IN RIGHT WRIST: ICD-10-CM

## 2021-06-04 DIAGNOSIS — M77.8 RIGHT WRIST TENDONITIS: Primary | ICD-10-CM

## 2021-06-04 DIAGNOSIS — R52 PAIN: ICD-10-CM

## 2021-06-04 PROCEDURE — 73110 X-RAY EXAM OF WRIST: CPT

## 2021-06-04 PROCEDURE — 99204 OFFICE O/P NEW MOD 45 MIN: CPT | Performed by: ORTHOPAEDIC SURGERY

## 2021-06-04 RX ORDER — MOMETASONE FUROATE 100 %
POWDER (GRAM) MISCELLANEOUS
COMMUNITY
End: 2021-07-21 | Stop reason: ALTCHOICE

## 2021-06-04 NOTE — PROGRESS NOTES
ASSESSMENT/PLAN:    Assessment:   Right ulnar sided wrist pain    Plan:   Dynamic US right wrist evaluate ECU  MRI right wrist     Follow Up: After Testing    To Do Next Visit:  Discuss US and MRI    _____________________________________________________  CHIEF COMPLAINT:  Chief Complaint   Patient presents with    Right Wrist - Pain         SUBJECTIVE:  Kimberly Garcia is a 32 y o  female who presents with Pain  Moderate  Intermittant  Sharp, Dull and Aching to the right ulnar sided wrist   This started  1 year(s) ago as Insidious  Patient noticed pain started last year during volleyball  She rested due to volleyball stopping due to Vini    On 5/27/21 while playing volleyball she had a sharp pain followed by ecchymosis and swelling over the ulnar aspect of the wrist  Radiation: Yes to the  medial hand  Previous Treatments: None without relief  Associated symptoms: Pain  Moderate  Intermittant  Sharp, Dull and Aching  Handedness: right      PAST MEDICAL HISTORY:  Past Medical History:   Diagnosis Date    Chlamydia     Environmental allergies     Hypertension     Stomach problems        PAST SURGICAL HISTORY:  Past Surgical History:   Procedure Laterality Date    COLONOSCOPY N/A 2014    HEMORRHOID SURGERY  03/03/2021    MOUTH SURGERY N/A 2007    7 teeth pulled before braces were put on       FAMILY HISTORY:  Family History   Problem Relation Age of Onset    Hyperlipidemia Mother     Diabetes Father     Cancer Family     Diabetes Family     Hypertension Family        SOCIAL HISTORY:  Social History     Tobacco Use    Smoking status: Never Smoker    Smokeless tobacco: Never Used   Substance Use Topics    Alcohol use: No    Drug use: No       MEDICATIONS:    Current Outpatient Medications:     ferrous sulfate 325 (65 Fe) mg tablet, Take 65 mg by mouth 3 (three) times a week, Disp: , Rfl:     hyoscyamine (LEVBID) 0 375 mg 12 hr tablet, TK 1 T PO Q 12 H, Disp: , Rfl:     loratadine (CLARITIN) 10 mg tablet, Take 10 mg by mouth daily, Disp: , Rfl:     Mometasone Furoate POWD, Use, Disp: , Rfl:     Probiotic Product (PROBIOTIC-10 PO), Take by mouth, Disp: , Rfl:     Tri-Lo-Sprintec 0 18/0 215/0 25 MG-25 MCG per tablet, Take 1 tablet by mouth daily, Disp: 84 tablet, Rfl: 0    Biotin 1000 MCG tablet, Take 1,000 mcg by mouth 3 (three) times a day, Disp: , Rfl:     cholecalciferol (VITAMIN D3) 1,000 units tablet, Take by mouth, Disp: , Rfl:     ALLERGIES:  No Known Allergies    REVIEW OF SYSTEMS:  Pertinent items are noted in HPI  A comprehensive review of systems was negative      LABS:  HgA1c: No results found for: HGBA1C  BMP: No results found for: GLUCOSE, CALCIUM, NA, K, CO2, CL, BUN, CREATININE      _____________________________________________________  PHYSICAL EXAMINATION:  Vital signs: /95   Pulse 98   Ht 5' 1" (1 549 m)   Wt 51 4 kg (113 lb 6 4 oz)   BMI 21 43 kg/m²   General: well developed and well nourished, alert, oriented times 3 and appears comfortable  Psychiatric: Normal  HEENT: Trachea Midline, No torticollis  Cardiovascular: No discernable arrhythmia  Pulmonary: No wheezing or stridor  Abdomen: No rebound or guarding  Extremities: No peripheral edema  Skin: No masses, erythema, lacerations, fluctation, ulcerations  Neurovascular: Sensation Intact to the Median, Ulnar, Radial Nerve, Motor Intact to the Median, Ulnar, Radial Nerve and Pulses Intact    MUSCULOSKELETAL EXAMINATION:  Right wrist:  Full ROM without crepitation  Dart throwers motion good  Non TTP scaphoid, lunate, SL ligament, STT joint, capitate, CL joint, lunate facet, scaphoid facet, snuff box, 5th CMC, triquetral hamate joint, TFCC, pisotriquetral   Mildly TTP: dorsal triquetrum, ECU  No mid carpal instabilty  Negative mckay, MILLY shuck and LT shuck  Risa shear normal  Positive synergy with more pronounced ECU  DRUJ stable  ECU tension causes pain  _____________________________________________________  STUDIES REVIEWED:  Images were reviewed in PACS by Dr Dominguez Mc and demonstrate: Right wrist x-rays demonstrates no fracture or dislocation, no osseous abnormality       PROCEDURES PERFORMED:  Procedures  No Procedures performed today

## 2021-06-09 DIAGNOSIS — Z01.419 WOMEN'S ANNUAL ROUTINE GYNECOLOGICAL EXAMINATION: ICD-10-CM

## 2021-06-09 DIAGNOSIS — Z30.41 ENCOUNTER FOR SURVEILLANCE OF CONTRACEPTIVE PILLS: ICD-10-CM

## 2021-06-09 RX ORDER — NORGESTIMATE AND ETHINYL ESTRADIOL
1 KIT DAILY
Qty: 84 TABLET | Refills: 0 | Status: SHIPPED | OUTPATIENT
Start: 2021-06-09 | End: 2021-07-12 | Stop reason: SDUPTHER

## 2021-06-22 ENCOUNTER — HOSPITAL ENCOUNTER (OUTPATIENT)
Dept: RADIOLOGY | Facility: HOSPITAL | Age: 31
Discharge: HOME | End: 2021-06-22
Attending: ORTHOPAEDIC SURGERY
Payer: COMMERCIAL

## 2021-06-22 DIAGNOSIS — M77.8 RIGHT WRIST TENDONITIS: ICD-10-CM

## 2021-06-22 DIAGNOSIS — M25.531 PAIN IN RIGHT WRIST: ICD-10-CM

## 2021-06-22 PROCEDURE — 76882 US LMTD JT/FCL EVL NVASC XTR: CPT

## 2021-06-22 PROCEDURE — 73221 MRI JOINT UPR EXTREM W/O DYE: CPT

## 2021-06-22 PROCEDURE — G1004 CDSM NDSC: HCPCS

## 2021-07-12 ENCOUNTER — ANNUAL EXAM (OUTPATIENT)
Dept: OBGYN CLINIC | Facility: CLINIC | Age: 31
End: 2021-07-12
Payer: COMMERCIAL

## 2021-07-12 VITALS
DIASTOLIC BLOOD PRESSURE: 84 MMHG | BODY MASS INDEX: 21.41 KG/M2 | SYSTOLIC BLOOD PRESSURE: 122 MMHG | WEIGHT: 113.4 LBS | HEIGHT: 61 IN

## 2021-07-12 DIAGNOSIS — Z30.41 ENCOUNTER FOR SURVEILLANCE OF CONTRACEPTIVE PILLS: ICD-10-CM

## 2021-07-12 DIAGNOSIS — Z01.419 WOMEN'S ANNUAL ROUTINE GYNECOLOGICAL EXAMINATION: Primary | ICD-10-CM

## 2021-07-12 PROCEDURE — 99395 PREV VISIT EST AGE 18-39: CPT | Performed by: OBSTETRICS & GYNECOLOGY

## 2021-07-12 RX ORDER — ACTIVATED CHARCOAL, STRYCHNOS NUX-VOMICA SEED, SILVER NITRATE, ARSENIC TRIOXIDE, AND IPECAC 3; 3; 6; 6; 6 [HP_X]/1; [HP_X]/1; [HP_X]/1; [HP_X]/1; [HP_X]/1
TABLET ORAL
COMMUNITY
Start: 2021-06-01

## 2021-07-12 RX ORDER — OMEGA-3 FATTY ACIDS/FISH OIL 300-1000MG
CAPSULE ORAL
COMMUNITY

## 2021-07-12 RX ORDER — PEPPERMINT OIL 90 MG
CAPSULE, DELAYED, AND EXTENDED RELEASE ORAL
COMMUNITY
Start: 2021-06-01

## 2021-07-12 RX ORDER — NORGESTIMATE AND ETHINYL ESTRADIOL 7DAYSX3 LO
1 KIT ORAL DAILY
Qty: 28 TABLET | Refills: 11 | Status: SHIPPED | OUTPATIENT
Start: 2021-07-12 | End: 2022-06-14

## 2021-07-12 NOTE — PROGRESS NOTES
Assessment     Annual well-woman exam    History of recurrent BV currently asymptomatic    Contraceptive management        Plan     Renew OCPs    Pap smear deferred, next Pap due in 2023   All questions answered  Subjective  Here for exam     Brenda Andersen is a 32 y o  female who presents for annual exam  Periods are regular every 28-30 days, lasting 5 days  Dysmenorrhea:none  Cyclic symptoms include none  No intermenstrual bleeding, spotting, or discharge  The patient reports that there is not domestic violence in her life  Current contraception: OCP (estrogen/progesterone)  History of abnormal Pap smear: no  Family history of uterine or ovarian cancer: no  Regular self breast exam: yes  History of abnormal mammogram: no  Family history of breast cancer: no  History of abnormal lipids: no  Menstrual History:  OB History    No obstetric history on file  Menarche age: 15  Patient's last menstrual period was 06/16/2021 (exact date)  Review of Systems  Pertinent items are noted in HPI        Objective  No acute distress   /84 (BP Location: Left arm, Patient Position: Sitting, Cuff Size: Standard)   Ht 5' 1" (1 549 m)   Wt 51 4 kg (113 lb 6 4 oz)   LMP 06/16/2021 (Exact Date)   BMI 21 43 kg/m²     General:   alert and oriented, in no acute distress, alert, appears stated age and cooperative   Heart: regular rate and rhythm, S1, S2 normal, no murmur, click, rub or gallop   Lungs: clear to auscultation bilaterally   Abdomen: soft, non-tender, without masses or organomegaly   Vulva: normal, Bartholin's, Urethra, [place]'s normal, female escutcheon   Vagina: normal mucosa, normal discharge   Cervix: no cervical motion tenderness, no lesions and nulliparous appearance   Uterus: normal size, mobile, non-tender, normal shape and consistency   Adnexa: normal adnexa and no mass, fullness, tenderness   Bilateral breast exam in the sitting and supine position with chaperone present, no visible or palpable breast lesions identified  No breast masses noted  No supraclavicular or axillary lymphadenopathy noted  No nipple discharge  Reviewed self-breast exam techniques  Rectal exam, no rectal masses, normal tone, no hemorrhoids visible or palpable  Hemoccult negative

## 2021-07-21 ENCOUNTER — OFFICE VISIT (OUTPATIENT)
Dept: OBGYN CLINIC | Facility: HOSPITAL | Age: 31
End: 2021-07-21
Payer: COMMERCIAL

## 2021-07-21 VITALS
WEIGHT: 113 LBS | DIASTOLIC BLOOD PRESSURE: 85 MMHG | BODY MASS INDEX: 20.8 KG/M2 | HEIGHT: 62 IN | SYSTOLIC BLOOD PRESSURE: 126 MMHG | HEART RATE: 76 BPM

## 2021-07-21 DIAGNOSIS — M77.21 INFLAMMATION AROUND WRIST, RIGHT: Primary | ICD-10-CM

## 2021-07-21 PROCEDURE — 99213 OFFICE O/P EST LOW 20 MIN: CPT | Performed by: ORTHOPAEDIC SURGERY

## 2021-07-21 NOTE — PROGRESS NOTES
ASSESSMENT/PLAN:    Assessment:   Right wrist inflammation with joint capsular irritation    Plan:   Resume activities as tolerated and Weems taping for wrist, shown today in the office  Follow Up:  PRN    To Do Next Visit:   advised to call    General Discussions:   if pain returns and becomes more persistent she may benefit from an arthroscopy    Operative Discussions:  None indicated    _____________________________________________________  CHIEF COMPLAINT:  Chief Complaint   Patient presents with    Right Wrist - Follow-up     F/U after U/S and MRI  performed 6/22/21         SUBJECTIVE:  Mague Darnell is a 32 y o  female who presents for follow up regarding  Ulnar-sided right wrist pain  She presented early last month with 1 year history of insidious onset of pain which limited her volleyball playing ability  She is a setter  She was sent for an MRI and ultrasound and returns today discuss those studies  Since her studies last month her pain has been very far and few between and sporadic  Her symptoms when she does get them have been tolerable otherwise      Handedness: right      PAST MEDICAL HISTORY:  Past Medical History:   Diagnosis Date    Chlamydia     Environmental allergies     Hypertension     Stomach problems        PAST SURGICAL HISTORY:  Past Surgical History:   Procedure Laterality Date    COLONOSCOPY N/A 2014    HEMORRHOID SURGERY  03/03/2021    MOUTH SURGERY N/A 2007    7 teeth pulled before braces were put on       FAMILY HISTORY:  Family History   Problem Relation Age of Onset    Hyperlipidemia Mother     Diabetes Father     Cancer Family     Diabetes Family     Hypertension Family        SOCIAL HISTORY:  Social History     Tobacco Use    Smoking status: Never Smoker    Smokeless tobacco: Never Used   Substance Use Topics    Alcohol use: No    Drug use: No       MEDICATIONS:    Current Outpatient Medications:     Biotin 1000 MCG tablet, Take 1,000 mcg by mouth 3 (three) times a day, Disp: , Rfl:     BORIC ACID EX, Insert into the vagina, Disp: , Rfl:     cholecalciferol (VITAMIN D3) 1,000 units tablet, Take by mouth, Disp: , Rfl:     ferrous sulfate 325 (65 Fe) mg tablet, Take 65 mg by mouth 3 (three) times a week, Disp: , Rfl:     FERROUS SULFATE PO, Take 65 mg by mouth, Disp: , Rfl:     Homeopathic Products (Indigestion/Bloating Relief) SUBL, , Disp: , Rfl:     hyoscyamine (LEVBID) 0 375 mg 12 hr tablet, TK 1 T PO Q 12 H, Disp: , Rfl:     Ibuprofen (Advil) 200 MG CAPS, Take by mouth, Disp: , Rfl:     loratadine (CLARITIN) 10 mg tablet, Take 10 mg by mouth daily, Disp: , Rfl:     Norgestim-Eth Estrad Triphasic (NORGESTIMATE-ETHINYL ESTRADIOL PO), Take 1 each by mouth, Disp: , Rfl:     norgestimate-ethinyl estradiol (Tri-Lo-Sprintec) 0 18/0 215/0 25 MG-25 MCG per tablet, Take 1 tablet by mouth daily for 28 days, Disp: 28 tablet, Rfl: 11    Peppermint Oil (IBgard) 90 MG CPCR, , Disp: , Rfl:     Probiotic Product (PROBIOTIC-10 PO), Take by mouth, Disp: , Rfl:     ALLERGIES:  No Known Allergies    REVIEW OF SYSTEMS:  Pertinent items are noted in HPI  A comprehensive review of systems was negative      LABS:  HgA1c: No results found for: HGBA1C  BMP: No results found for: GLUCOSE, CALCIUM, NA, K, CO2, CL, BUN, CREATININE        _____________________________________________________  PHYSICAL EXAMINATION:  Vital signs: /85   Pulse 76   Ht 5' 1 5" (1 562 m)   Wt 51 3 kg (113 lb)   BMI 21 01 kg/m²   General: well developed and well nourished, alert, oriented times 3 and appears comfortable  Psychiatric: Normal  HEENT: Trachea Midline, No torticollis  Cardiovascular: No discernable arrhythmia  Pulmonary: No wheezing or stridor  Abdomen: No rebound or guarding  Extremities: No peripheral edema  Skin: No masses, erythema, lacerations, fluctation, ulcerations  Neurovascular: Sensation Intact to the Median, Ulnar, Radial Nerve, Motor Intact to the Median, Ulnar, Radial Nerve and Pulses Intact    MUSCULOSKELETAL EXAMINATION:  RIGHT SIDE:  Wrist:  Full ROM, No Tenderness, No Instability, Negative SL Shuck, Normal Case, Negative LT shuck, Negative TFCC circumduction and Negative ECU subluxation    _____________________________________________________  STUDIES REVIEWED:  Images were reviewed in PACS by Dr Priscila Vance and demonstrate: right wrist MRI shows: ulnocarpal capsular inflammation, intact TFCC and no ECU injury  Right wrist US: normal      PROCEDURES PERFORMED:  Procedures  No Procedures performed today   Scribe Attestation    I,:  Tessie Yeh am acting as a scribe while in the presence of the attending physician :       I,:  Nidhi Azar MD personally performed the services described in this documentation    as scribed in my presence :           Scribe Attestation    I,:  Tessie Yeh am acting as a scribe while in the presence of the attending physician :       I,:  Nidhi Azar MD personally performed the services described in this documentation    as scribed in my presence :

## 2022-06-14 DIAGNOSIS — Z30.41 ENCOUNTER FOR SURVEILLANCE OF CONTRACEPTIVE PILLS: ICD-10-CM

## 2022-06-14 DIAGNOSIS — Z01.419 WOMEN'S ANNUAL ROUTINE GYNECOLOGICAL EXAMINATION: ICD-10-CM

## 2022-06-14 RX ORDER — NORGESTIMATE AND ETHINYL ESTRADIOL
KIT
Qty: 28 TABLET | Refills: 11 | Status: SHIPPED | OUTPATIENT
Start: 2022-06-14

## 2022-07-26 ENCOUNTER — ANNUAL EXAM (OUTPATIENT)
Dept: OBGYN CLINIC | Facility: CLINIC | Age: 32
End: 2022-07-26
Payer: COMMERCIAL

## 2022-07-26 VITALS
HEIGHT: 61 IN | SYSTOLIC BLOOD PRESSURE: 124 MMHG | WEIGHT: 114 LBS | BODY MASS INDEX: 21.52 KG/M2 | DIASTOLIC BLOOD PRESSURE: 80 MMHG

## 2022-07-26 DIAGNOSIS — N89.8 VAGINAL DISCHARGE: ICD-10-CM

## 2022-07-26 DIAGNOSIS — Z01.419 ENCOUNTER FOR ANNUAL ROUTINE GYNECOLOGICAL EXAMINATION: Primary | ICD-10-CM

## 2022-07-26 DIAGNOSIS — N76.0 BV (BACTERIAL VAGINOSIS): ICD-10-CM

## 2022-07-26 DIAGNOSIS — B96.89 BV (BACTERIAL VAGINOSIS): ICD-10-CM

## 2022-07-26 DIAGNOSIS — Z30.41 ENCOUNTER FOR SURVEILLANCE OF CONTRACEPTIVE PILLS: ICD-10-CM

## 2022-07-26 PROCEDURE — S0612 ANNUAL GYNECOLOGICAL EXAMINA: HCPCS | Performed by: OBSTETRICS & GYNECOLOGY

## 2022-07-26 NOTE — PROGRESS NOTES
Assessment     Annual well-woman exam    Contraceptive management    Bacterial vaginosis        Plan     Follow-up in 1 year p r n  Renew OCPs    Boric acid suppositories p r n  Pap smear deferred, normal in 2020 next Pap due in 2023  All questions answered  Subjective  Here for annual exam     David Resendez is a 28 y o  female who presents for annual exam  Periods are regular every 28-30 days, lasting 5 days  Dysmenorrhea:none  Cyclic symptoms include pelvic pain  No intermenstrual bleeding, spotting, or discharge  Patient does have a history of recurrent bacterial vaginosis  She manages herself with boric acid suppositories as needed  Declines antibiotics at this time  The patient reports that there is not domestic violence in her life  Current contraception: OCP (estrogen/progesterone)  History of abnormal Pap smear: no  Family history of uterine or ovarian cancer: no  Regular self breast exam: yes  History of abnormal mammogram: no  Family history of breast cancer: no  History of abnormal lipids:  unknown  Menstrual History:  OB History    No obstetric history on file  Menarche age: 15  Patient's last menstrual period was 07/13/2022 (exact date)  Review of Systems  Pertinent items are noted in HPI        Objective  No acute distress     /80   Ht 5' 1" (1 549 m)   Wt 51 7 kg (114 lb)   LMP 07/13/2022 (Exact Date)   BMI 21 54 kg/m²     General:   alert and oriented, in no acute distress, alert, appears stated age and cooperative   Heart: regular rate and rhythm, S1, S2 normal, no murmur, click, rub or gallop   Lungs: clear to auscultation bilaterally   Abdomen: soft, non-tender, without masses or organomegaly   Vulva: normal, Bartholin's, Urethra, Lebec's normal, female escutcheon   Vagina: normal mucosa, no palpable nodules, wet prep done,  Clue cells identified   Cervix: anteverted, no lesions and nulliparous appearance   Uterus: normal size, anteverted, mobile, non-tender, normal shape and consistency   Adnexa: normal adnexa and no mass, fullness, tenderness   Bilateral breast exam in the sitting and supine position with chaperone present, no visible or palpable breast lesions identified  No breast masses noted  No supraclavicular or axillary lymphadenopathy noted  No nipple discharge  Reviewed self-breast exam techniques     Rectal exam,  deferred

## 2023-02-01 ENCOUNTER — RA CDI HCC (OUTPATIENT)
Dept: OTHER | Facility: HOSPITAL | Age: 33
End: 2023-02-01

## 2023-02-14 ENCOUNTER — OFFICE VISIT (OUTPATIENT)
Dept: INTERNAL MEDICINE CLINIC | Facility: CLINIC | Age: 33
End: 2023-02-14

## 2023-02-14 VITALS
OXYGEN SATURATION: 99 % | HEART RATE: 84 BPM | WEIGHT: 113.2 LBS | BODY MASS INDEX: 21.37 KG/M2 | DIASTOLIC BLOOD PRESSURE: 84 MMHG | SYSTOLIC BLOOD PRESSURE: 142 MMHG | HEIGHT: 61 IN

## 2023-02-14 DIAGNOSIS — E55.9 VITAMIN D DEFICIENCY: ICD-10-CM

## 2023-02-14 DIAGNOSIS — Z13.220 SCREENING, LIPID: ICD-10-CM

## 2023-02-14 DIAGNOSIS — D64.9 ANEMIA, UNSPECIFIED TYPE: ICD-10-CM

## 2023-02-14 DIAGNOSIS — K59.09 CHRONIC CONSTIPATION: Primary | ICD-10-CM

## 2023-02-14 DIAGNOSIS — F10.21 ALCOHOL DEPENDENCE IN REMISSION (HCC): ICD-10-CM

## 2023-02-14 DIAGNOSIS — I10 HYPERTENSION, UNSPECIFIED TYPE: ICD-10-CM

## 2023-02-14 DIAGNOSIS — R53.82 CHRONIC FATIGUE: ICD-10-CM

## 2023-02-14 DIAGNOSIS — M25.50 ARTHRALGIA OF MULTIPLE JOINTS: ICD-10-CM

## 2023-02-14 PROBLEM — K58.9 IBS (IRRITABLE BOWEL SYNDROME): Status: RESOLVED | Noted: 2021-06-01 | Resolved: 2023-02-14

## 2023-02-14 PROBLEM — K58.9 IBS (IRRITABLE BOWEL SYNDROME): Status: ACTIVE | Noted: 2021-06-01

## 2023-02-14 NOTE — ASSESSMENT & PLAN NOTE
BP has been high in other offices for a long time  She just purchased a BP cuff so will start checking at home  She can check BID goal /80

## 2023-02-14 NOTE — PATIENT INSTRUCTIONS

## 2023-02-14 NOTE — PROGRESS NOTES
Assessment/Plan:    IBS (irritable bowel syndrome)        Chronic constipation  Started in her teens, saw GI at [facility] ad has had labs, colonoscopy  She had a colonoscopy last year for dark stools ad dx with a fissure, hemorrhoids  She had a procedure for this in March 2022  Chronic indigestion, experiences stabbing abd pain with constipation for 3-4 days releived by a BM  She does not have diarrhea  She been placed on  lansoprazole and hyoscyamine without significant relief      Hypertension  BP has been high in other offices for a long time  She just purchased a BP cuff so will start checking at home  She can check BID goal /80    Alcohol dependence in remission ([facility] )  Sober for 6 years         Problem List Items Addressed This Visit        Digestive    Chronic constipation - Primary     Started in her teens, saw GI at [facility] ad has had labs, colonoscopy  She had a colonoscopy last year for dark stools ad dx with a fissure, hemorrhoids  She had a procedure for this in March 2022  Chronic indigestion, experiences stabbing abd pain with constipation for 3-4 days releived by a BM  She does not have diarrhea   She been placed on  lansoprazole and hyoscyamine without significant relief           Relevant Orders    TSH, 3rd generation with Free T4 reflex       Cardiovascular and Mediastinum    Hypertension     BP has been high in other offices for a long time  She just purchased a BP cuff so will start checking at home  She can check BID goal /80         Relevant Orders    CBC and differential    Comprehensive metabolic panel    TSH, 3rd generation with Free T4 reflex    UA w Reflex to Microscopic w Reflex to Culture -Lab Collect    Metanephrine, Fractionated Plasma Free    Aldosterone/Renin Ratio       Other    Alcohol dependence in remission ([facility] )     Sober for 6 years        Other Visit Diagnoses     Chronic fatigue        Relevant Orders    CBC and differential    Comprehensive metabolic panel    TSH, 3rd generation with Free T4 reflex    Arthralgia of multiple joints        Relevant Orders    TSH, 3rd generation with Free T4 reflex    C-reactive protein    Sedimentation rate, automated    Vitamin D deficiency        Relevant Orders    Vitamin D 25 hydroxy    Anemia, unspecified type        Relevant Orders    Ferritin    Screening, lipid        Relevant Orders    Lipid Panel with Direct LDL reflex            Subjective:      Patient ID: Librado Bartlett is a 28 y o  female  HPI  Here to establish care  Chronic constipation, fatigue, arthralgias  She has been checked for celiac, Lyme disease  Elevated BP     The following portions of the patient's history were reviewed and updated as appropriate: allergies, current medications, past family history, past medical history, past social history, past surgical history and problem list     Review of Systems   Constitutional: Negative for chills, fever and unexpected weight change  Respiratory: Negative for shortness of breath  Cardiovascular: Negative for chest pain and palpitations  Gastrointestinal: Positive for abdominal pain and constipation  Genitourinary: Negative for difficulty urinating  Musculoskeletal: Positive for arthralgias  Neurological: Negative for dizziness and headaches  Objective:      /84   Pulse 84   Ht 5' 0 95" (1 548 m)   Wt 51 3 kg (113 lb 3 2 oz)   SpO2 99%   BMI 21 43 kg/m²          Physical Exam  Constitutional:       General: She is not in acute distress  Appearance: She is well-developed  She is not ill-appearing, toxic-appearing or diaphoretic  Eyes:      Conjunctiva/sclera: Conjunctivae normal    Cardiovascular:      Rate and Rhythm: Normal rate and regular rhythm  Heart sounds: Normal heart sounds  No murmur heard  Pulmonary:      Effort: Pulmonary effort is normal  No respiratory distress  Breath sounds: Normal breath sounds  No wheezing or rales     Abdominal:      General: There is no distension  Palpations: Abdomen is soft  There is no mass  Tenderness: There is no abdominal tenderness  There is no guarding or rebound  Neurological:      Mental Status: She is alert and oriented to person, place, and time  Psychiatric:         Mood and Affect: Mood normal          Behavior: Behavior normal          Thought Content:  Thought content normal          Judgment: Judgment normal

## 2023-02-14 NOTE — ASSESSMENT & PLAN NOTE
Started in her teens, saw GI at [facility] ad has had labs, colonoscopy  She had a colonoscopy last year for dark stools ad dx with a fissure, hemorrhoids  She had a procedure for this in March 2022  Chronic indigestion, experiences stabbing abd pain with constipation for 3-4 days releived by a BM  She does not have diarrhea   She been placed on  lansoprazole and hyoscyamine without significant relief

## 2023-02-24 LAB
25(OH)D3 SERPL-MCNC: 19 NG/ML (ref 30–100)
ALBUMIN SERPL-MCNC: 4.2 G/DL (ref 3.6–5.1)
ALBUMIN/GLOB SERPL: 1.6 (CALC) (ref 1–2.5)
ALDOST SERPL-MCNC: 3 NG/DL
ALDOST/RENIN PLAS-RTO: 1.8 RATIO (ref 0.9–28.9)
ALP SERPL-CCNC: 43 U/L (ref 31–125)
ALT SERPL-CCNC: 10 U/L (ref 6–29)
APPEARANCE UR: CLEAR
AST SERPL-CCNC: 18 U/L (ref 10–30)
BACTERIA UR QL AUTO: NORMAL /HPF
BASOPHILS # BLD AUTO: 30 CELLS/UL (ref 0–200)
BASOPHILS NFR BLD AUTO: 0.4 %
BILIRUB SERPL-MCNC: 0.5 MG/DL (ref 0.2–1.2)
BILIRUB UR QL STRIP: NEGATIVE
BUN SERPL-MCNC: 8 MG/DL (ref 7–25)
BUN/CREAT SERPL: NORMAL (CALC) (ref 6–22)
CALCIUM SERPL-MCNC: 9.3 MG/DL (ref 8.6–10.2)
CHLORIDE SERPL-SCNC: 104 MMOL/L (ref 98–110)
CHOLEST SERPL-MCNC: 232 MG/DL
CHOLEST/HDLC SERPL: 3 (CALC)
CO2 SERPL-SCNC: 25 MMOL/L (ref 20–32)
COLOR UR: YELLOW
CREAT SERPL-MCNC: 0.67 MG/DL (ref 0.5–0.97)
CRP SERPL-MCNC: 6.5 MG/L
EOSINOPHIL # BLD AUTO: 118 CELLS/UL (ref 15–500)
EOSINOPHIL NFR BLD AUTO: 1.6 %
ERYTHROCYTE [DISTWIDTH] IN BLOOD BY AUTOMATED COUNT: 12.4 % (ref 11–15)
ERYTHROCYTE [SEDIMENTATION RATE] IN BLOOD BY WESTERGREN METHOD: 6 MM/H
FERRITIN SERPL-MCNC: 37 NG/ML (ref 16–154)
GFR/BSA.PRED SERPLBLD CYS-BASED-ARV: 118 ML/MIN/1.73M2
GLOBULIN SER CALC-MCNC: 2.6 G/DL (CALC) (ref 1.9–3.7)
GLUCOSE SERPL-MCNC: 78 MG/DL (ref 65–99)
GLUCOSE UR QL STRIP: NEGATIVE
HCT VFR BLD AUTO: 37.6 % (ref 35–45)
HDLC SERPL-MCNC: 78 MG/DL
HGB BLD-MCNC: 12.5 G/DL (ref 11.7–15.5)
HGB UR QL STRIP: NEGATIVE
HYALINE CASTS #/AREA URNS LPF: NORMAL /LPF
KETONES UR QL STRIP: NEGATIVE
LDLC SERPL CALC-MCNC: 124 MG/DL (CALC)
LEUKOCYTE ESTERASE UR QL STRIP: NEGATIVE
LYMPHOCYTES # BLD AUTO: 1717 CELLS/UL (ref 850–3900)
LYMPHOCYTES NFR BLD AUTO: 23.2 %
MCH RBC QN AUTO: 29.5 PG (ref 27–33)
MCHC RBC AUTO-ENTMCNC: 33.2 G/DL (ref 32–36)
MCV RBC AUTO: 88.7 FL (ref 80–100)
METANEPH FREE SERPL-MCNC: <25 PG/ML
METANEPHS SERPL-MCNC: 53 PG/ML
MONOCYTES # BLD AUTO: 503 CELLS/UL (ref 200–950)
MONOCYTES NFR BLD AUTO: 6.8 %
NEUTROPHILS # BLD AUTO: 5032 CELLS/UL (ref 1500–7800)
NEUTROPHILS NFR BLD AUTO: 68 %
NITRITE UR QL STRIP: NEGATIVE
NONHDLC SERPL-MCNC: 154 MG/DL (CALC)
NORMETANEPH FREE SERPL-MCNC: 53 PG/ML
PH UR STRIP: 5.5 [PH] (ref 5–8)
PLATELET # BLD AUTO: 339 THOUSAND/UL (ref 140–400)
PMV BLD REES-ECKER: 10 FL (ref 7.5–12.5)
POTASSIUM SERPL-SCNC: 4.6 MMOL/L (ref 3.5–5.3)
PROT SERPL-MCNC: 6.8 G/DL (ref 6.1–8.1)
PROT UR QL STRIP: NEGATIVE
RBC # BLD AUTO: 4.24 MILLION/UL (ref 3.8–5.1)
RBC #/AREA URNS HPF: NORMAL /HPF
RENIN PLAS-CCNC: 1.71 NG/ML/H (ref 0.25–5.82)
SODIUM SERPL-SCNC: 138 MMOL/L (ref 135–146)
SP GR UR STRIP: 1 (ref 1–1.03)
SQUAMOUS #/AREA URNS HPF: NORMAL /HPF
TRIGL SERPL-MCNC: 187 MG/DL
TSH SERPL-ACNC: 0.75 MIU/L
WBC # BLD AUTO: 7.4 THOUSAND/UL (ref 3.8–10.8)
WBC #/AREA URNS HPF: NORMAL /HPF

## 2023-03-16 ENCOUNTER — OFFICE VISIT (OUTPATIENT)
Dept: INTERNAL MEDICINE CLINIC | Facility: CLINIC | Age: 33
End: 2023-03-16

## 2023-03-16 VITALS
OXYGEN SATURATION: 98 % | HEART RATE: 77 BPM | BODY MASS INDEX: 21.11 KG/M2 | WEIGHT: 111.8 LBS | RESPIRATION RATE: 16 BRPM | HEIGHT: 61 IN | SYSTOLIC BLOOD PRESSURE: 120 MMHG | DIASTOLIC BLOOD PRESSURE: 80 MMHG

## 2023-03-16 DIAGNOSIS — I10 HYPERTENSION, UNSPECIFIED TYPE: Primary | ICD-10-CM

## 2023-03-16 DIAGNOSIS — E55.9 VITAMIN D DEFICIENCY: ICD-10-CM

## 2023-03-16 DIAGNOSIS — K59.09 CHRONIC CONSTIPATION: ICD-10-CM

## 2023-03-16 RX ORDER — ERGOCALCIFEROL 1.25 MG/1
50000 CAPSULE ORAL WEEKLY
Qty: 12 CAPSULE | Refills: 0 | Status: SHIPPED | OUTPATIENT
Start: 2023-03-16

## 2023-03-16 NOTE — PROGRESS NOTES
Assessment/Plan:    Hypertension  Non smoker, denies a high salt diet  Metanephrines, renin aldosterone normal  Her wrist machine is giving higher readings that our manual cuff  145/106 right  140/98 left  Start checking BP at home regularly and report readings in the next 2 weeks      Chronic constipation  Normal work up  [employer]    After 3months of D2, take OTC 2000IU daily     Problem List Items Addressed This Visit        Digestive    Chronic constipation     Normal work up  [employer]           Relevant Medications    linaCLOtide 145 MCG CAPS       Cardiovascular and Mediastinum    Hypertension - Primary     Non smoker, denies a high salt diet  Metanephrines, renin aldosterone normal  Her wrist machine is giving higher readings that our manual cuff  145/106 right  140/98 left  Start checking BP at home regularly and report readings in the next 2 weeks          Other Visit Diagnoses     Vitamin D deficiency        Relevant Medications    ergocalciferol (VITAMIN D2) 50,000 units            Subjective:      Patient ID: Erika Lizama is a 35 y o  female  HPI  Here for a follow up  New patient a month ago with high BP, chronic constipation  She has a wrist cuff today  She has not started checking her BP at home  Labs reviewed and D is low, lipids elevated    The following portions of the patient's history were reviewed and updated as appropriate: allergies, current medications, past family history, past medical history, past social history, past surgical history and problem list     Review of Systems   Respiratory: Negative for shortness of breath  Cardiovascular: Negative for chest pain and palpitations  Gastrointestinal: Positive for constipation           Objective:      /80 (BP Location: Left arm)   Pulse 77   Resp 16   Ht 5' 1" (1 549 m)   Wt 50 7 kg (111 lb 12 8 oz)   SpO2 98%   BMI 21 12 kg/m²          Physical Exam  Constitutional:       General: She is not in acute distress  Appearance: She is well-developed  She is not ill-appearing, toxic-appearing or diaphoretic  Eyes:      Conjunctiva/sclera: Conjunctivae normal    Cardiovascular:      Rate and Rhythm: Normal rate and regular rhythm  Heart sounds: Normal heart sounds  No murmur heard  Pulmonary:      Effort: Pulmonary effort is normal  No respiratory distress  Breath sounds: Normal breath sounds  No wheezing or rales  Abdominal:      General: There is no distension  Palpations: Abdomen is soft  There is no mass  Tenderness: There is no abdominal tenderness  There is no guarding or rebound  Neurological:      Mental Status: She is alert and oriented to person, place, and time  Psychiatric:         Behavior: Behavior normal          Thought Content:  Thought content normal          Judgment: Judgment normal

## 2023-03-16 NOTE — ASSESSMENT & PLAN NOTE
Non smoker, denies a high salt diet  Metanephrines, renin aldosterone normal  Her wrist machine is giving higher readings that our manual cuff  145/106 right  140/98 left  Start checking BP at home regularly and report readings in the next 2 weeks

## 2023-06-07 DIAGNOSIS — Z01.419 WOMEN'S ANNUAL ROUTINE GYNECOLOGICAL EXAMINATION: ICD-10-CM

## 2023-06-07 DIAGNOSIS — Z30.41 ENCOUNTER FOR SURVEILLANCE OF CONTRACEPTIVE PILLS: ICD-10-CM

## 2023-06-07 RX ORDER — NORGESTIMATE AND ETHINYL ESTRADIOL 7DAYSX3 LO
1 KIT ORAL DAILY
Qty: 28 TABLET | Refills: 1 | Status: SHIPPED | OUTPATIENT
Start: 2023-06-07

## 2023-06-20 ENCOUNTER — OFFICE VISIT (OUTPATIENT)
Dept: INTERNAL MEDICINE CLINIC | Facility: CLINIC | Age: 33
End: 2023-06-20
Payer: COMMERCIAL

## 2023-06-20 VITALS
DIASTOLIC BLOOD PRESSURE: 86 MMHG | BODY MASS INDEX: 21.64 KG/M2 | HEART RATE: 89 BPM | OXYGEN SATURATION: 99 % | SYSTOLIC BLOOD PRESSURE: 128 MMHG | HEIGHT: 61 IN | WEIGHT: 114.6 LBS | RESPIRATION RATE: 16 BRPM

## 2023-06-20 DIAGNOSIS — I10 PRIMARY HYPERTENSION: ICD-10-CM

## 2023-06-20 DIAGNOSIS — I10 PRIMARY HYPERTENSION: Primary | ICD-10-CM

## 2023-06-20 DIAGNOSIS — E55.9 VITAMIN D DEFICIENCY: ICD-10-CM

## 2023-06-20 DIAGNOSIS — K59.09 CHRONIC CONSTIPATION: ICD-10-CM

## 2023-06-20 PROCEDURE — 99214 OFFICE O/P EST MOD 30 MIN: CPT | Performed by: INTERNAL MEDICINE

## 2023-06-20 RX ORDER — LABETALOL 100 MG/1
100 TABLET, FILM COATED ORAL 2 TIMES DAILY
Qty: 60 TABLET | Refills: 5 | Status: SHIPPED | OUTPATIENT
Start: 2023-06-20 | End: 2023-06-20

## 2023-06-20 RX ORDER — MELATONIN
1000 DAILY
Start: 2023-06-20

## 2023-06-20 RX ORDER — LABETALOL 100 MG/1
TABLET, FILM COATED ORAL
Qty: 180 TABLET | Refills: 0 | Status: SHIPPED | OUTPATIENT
Start: 2023-06-20

## 2023-06-20 NOTE — PROGRESS NOTES
"Assessment/Plan:    Hypertension  Agrees to starting treatment  Start labetalol 100mg BID  Continue checking BP regularly Goal /80        Chronic constipation  Agrees to continue Linzess 145mcg a day         Problem List Items Addressed This Visit        Digestive    Chronic constipation     Agrees to continue Linzess 145mcg a day            Cardiovascular and Mediastinum    Hypertension - Primary     Agrees to starting treatment  Start labetalol 100mg BID  Continue checking BP regularly Goal /80             Relevant Orders    Basic metabolic panel    VAS renal artery complete   Other Visit Diagnoses     Vitamin D deficiency        Relevant Medications    cholecalciferol (VITAMIN D3) 1,000 units tablet            Subjective:      Patient ID: Jessika Romero is a 35 y o  female  HPI  Here for a f/u   BP at home high 120-130s/90s  Long h/o high BP, strong family history, normal BMP, plasma renin aldosterone, metanephrines  Started Linzess in March 145mcg  She is less constipated, sometimes has diarrhea  Still feels bloated    The following portions of the patient's history were reviewed and updated as appropriate: allergies, current medications, past family history, past medical history, past social history, past surgical history and problem list     Review of Systems   Constitutional: Negative for unexpected weight change  Respiratory: Negative for shortness of breath  Cardiovascular: Negative for chest pain, palpitations and leg swelling  Gastrointestinal: Negative for abdominal pain, constipation and diarrhea  Neurological: Negative for dizziness and headaches  Objective:      /86   Pulse 89   Resp 16   Ht 5' 1\" (1 549 m)   Wt 52 kg (114 lb 9 6 oz)   SpO2 99%   BMI 21 65 kg/m²          Physical Exam  Constitutional:       General: She is not in acute distress  Appearance: She is well-developed  She is not ill-appearing, toxic-appearing or diaphoretic     Eyes:      " Conjunctiva/sclera: Conjunctivae normal    Cardiovascular:      Rate and Rhythm: Normal rate and regular rhythm  Heart sounds: Normal heart sounds  No murmur heard  Pulmonary:      Effort: Pulmonary effort is normal  No respiratory distress  Breath sounds: Normal breath sounds  No wheezing or rales  Abdominal:      General: There is no distension  Palpations: Abdomen is soft  There is no mass  Tenderness: There is no abdominal tenderness  There is no guarding or rebound  Musculoskeletal:      Cervical back: Neck supple  Right lower leg: No edema  Left lower leg: No edema  Neurological:      Mental Status: She is alert and oriented to person, place, and time  Psychiatric:         Mood and Affect: Mood normal          Behavior: Behavior normal          Thought Content:  Thought content normal          Judgment: Judgment normal

## 2023-08-02 ENCOUNTER — ANNUAL EXAM (OUTPATIENT)
Dept: GYNECOLOGY | Facility: CLINIC | Age: 33
End: 2023-08-02
Payer: COMMERCIAL

## 2023-08-02 VITALS
DIASTOLIC BLOOD PRESSURE: 78 MMHG | HEIGHT: 61 IN | SYSTOLIC BLOOD PRESSURE: 118 MMHG | BODY MASS INDEX: 22.13 KG/M2 | WEIGHT: 117.2 LBS

## 2023-08-02 DIAGNOSIS — K59.09 CHRONIC CONSTIPATION: ICD-10-CM

## 2023-08-02 DIAGNOSIS — Z30.41 ENCOUNTER FOR SURVEILLANCE OF CONTRACEPTIVE PILLS: ICD-10-CM

## 2023-08-02 DIAGNOSIS — Z01.419 WOMEN'S ANNUAL ROUTINE GYNECOLOGICAL EXAMINATION: ICD-10-CM

## 2023-08-02 DIAGNOSIS — A64 STD (FEMALE): Primary | ICD-10-CM

## 2023-08-02 DIAGNOSIS — I10 PRIMARY HYPERTENSION: ICD-10-CM

## 2023-08-02 PROCEDURE — G0476 HPV COMBO ASSAY CA SCREEN: HCPCS | Performed by: OBSTETRICS & GYNECOLOGY

## 2023-08-02 PROCEDURE — 87591 N.GONORRHOEAE DNA AMP PROB: CPT | Performed by: OBSTETRICS & GYNECOLOGY

## 2023-08-02 PROCEDURE — S0612 ANNUAL GYNECOLOGICAL EXAMINA: HCPCS | Performed by: OBSTETRICS & GYNECOLOGY

## 2023-08-02 PROCEDURE — G0145 SCR C/V CYTO,THINLAYER,RESCR: HCPCS | Performed by: OBSTETRICS & GYNECOLOGY

## 2023-08-02 PROCEDURE — 87491 CHLMYD TRACH DNA AMP PROBE: CPT | Performed by: OBSTETRICS & GYNECOLOGY

## 2023-08-02 RX ORDER — NORGESTIMATE AND ETHINYL ESTRADIOL 7DAYSX3 LO
1 KIT ORAL DAILY
Qty: 28 TABLET | Refills: 11 | Status: SHIPPED | OUTPATIENT
Start: 2023-08-02 | End: 2023-08-03 | Stop reason: SDUPTHER

## 2023-08-02 NOTE — PROGRESS NOTES
Assessment   Annual well woman exam  Contraceptive management  Primary hypertension  History of chronic constipation        Plan   Renew OCPs  Reviewed self breast exam techniques  No new symptoms   All questions answered. Await pap smear results. Subjective      Nisreen Cervantes is a 35 y.o. female who presents for annual exam. Periods are regular every 28-30 days, lasting 5 days. Dysmenorrhea:none. Cyclic symptoms include none. No intermenstrual bleeding, spotting, or discharge. The patient reports that there is not domestic violence in her life. Current contraception: OCP (estrogen/progesterone)  History of abnormal Pap smear: no  Family history of uterine or ovarian cancer: no  Regular self breast exam: yes  History of abnormal mammogram: no  Family history of breast cancer: no  History of abnormal lipids: no  Menstrual History:  OB History        0    Para   0    Term   0       0    AB   0    Living   0       SAB   0    IAB   0    Ectopic   0    Multiple   0    Live Births   0                Menarche age: 15  Patient's last menstrual period was 2023 (approximate). Review of Systems  Pertinent items are noted in HPI.       Objective no acute distress   /78 (BP Location: Right arm, Patient Position: Sitting, Cuff Size: Standard)   Ht 5' 1" (1.549 m)   Wt 53.2 kg (117 lb 3.2 oz)   LMP 2023 (Approximate)   BMI 22.14 kg/m²     General:   alert and oriented, in no acute distress, alert, appears stated age and cooperative   Heart: regular rate and rhythm, S1, S2 normal, no murmur, click, rub or gallop   Lungs: clear to auscultation bilaterally   Abdomen: soft, non-tender, without masses or organomegaly   Vulva: normal, Bartholin's, Urethra, Blaine's normal, female escutcheon   Vagina: normal mucosa, normal discharge, no palpable nodules   Cervix: anteverted, no cervical motion tenderness, no lesions and nulliparous appearance   Uterus: normal size, anteverted, mobile, non-tender, normal shape and consistency   Adnexa: normal adnexa and no mass, fullness, tenderness   Bilateral breast exam in the sitting and supine position with chaperone present, no visible or palpable breast lesions identified. No breast masses noted. No supraclavicular or axillary lymphadenopathy noted. No nipple discharge. Reviewed self-breast exam techniques.    Rectal exam,  deferred

## 2023-08-03 DIAGNOSIS — Z30.41 ENCOUNTER FOR SURVEILLANCE OF CONTRACEPTIVE PILLS: ICD-10-CM

## 2023-08-03 DIAGNOSIS — Z01.419 WOMEN'S ANNUAL ROUTINE GYNECOLOGICAL EXAMINATION: ICD-10-CM

## 2023-08-03 RX ORDER — NORGESTIMATE AND ETHINYL ESTRADIOL 7DAYSX3 LO
1 KIT ORAL DAILY
Qty: 84 TABLET | Refills: 3 | Status: SHIPPED | OUTPATIENT
Start: 2023-08-03

## 2023-08-03 NOTE — TELEPHONE ENCOUNTER
[facility] faxed request for a 90 day supply of patient's BCP given at her appointment yesterday 8/2/23. Please forward escript.

## 2023-08-04 LAB
C TRACH DNA SPEC QL NAA+PROBE: NEGATIVE
N GONORRHOEA DNA SPEC QL NAA+PROBE: NEGATIVE

## 2023-08-08 LAB
LAB AP GYN PRIMARY INTERPRETATION: NORMAL
LAB AP LMP: NORMAL
Lab: NORMAL

## 2023-09-02 DIAGNOSIS — K59.09 CHRONIC CONSTIPATION: ICD-10-CM

## 2023-09-02 RX ORDER — LINACLOTIDE 145 UG/1
CAPSULE, GELATIN COATED ORAL
Qty: 30 CAPSULE | Refills: 5 | Status: SHIPPED | OUTPATIENT
Start: 2023-09-02

## 2023-09-19 ENCOUNTER — OFFICE VISIT (OUTPATIENT)
Dept: PHYSICAL THERAPY | Facility: OTHER | Age: 33
End: 2023-09-19
Payer: COMMERCIAL

## 2023-09-19 ENCOUNTER — OFFICE VISIT (OUTPATIENT)
Dept: INTERNAL MEDICINE CLINIC | Facility: CLINIC | Age: 33
End: 2023-09-19
Payer: COMMERCIAL

## 2023-09-19 VITALS
HEIGHT: 61 IN | WEIGHT: 118 LBS | DIASTOLIC BLOOD PRESSURE: 80 MMHG | HEART RATE: 86 BPM | BODY MASS INDEX: 22.28 KG/M2 | OXYGEN SATURATION: 99 % | SYSTOLIC BLOOD PRESSURE: 122 MMHG

## 2023-09-19 DIAGNOSIS — I10 HYPERTENSION, UNSPECIFIED TYPE: Primary | ICD-10-CM

## 2023-09-19 DIAGNOSIS — M25.512 CHRONIC LEFT SHOULDER PAIN: Primary | ICD-10-CM

## 2023-09-19 DIAGNOSIS — K59.09 CHRONIC CONSTIPATION: ICD-10-CM

## 2023-09-19 DIAGNOSIS — G89.29 CHRONIC LEFT SHOULDER PAIN: Primary | ICD-10-CM

## 2023-09-19 DIAGNOSIS — I10 PRIMARY HYPERTENSION: ICD-10-CM

## 2023-09-19 PROCEDURE — 97140 MANUAL THERAPY 1/> REGIONS: CPT | Performed by: PHYSICAL THERAPIST

## 2023-09-19 PROCEDURE — 99214 OFFICE O/P EST MOD 30 MIN: CPT | Performed by: INTERNAL MEDICINE

## 2023-09-19 PROCEDURE — 97161 PT EVAL LOW COMPLEX 20 MIN: CPT | Performed by: PHYSICAL THERAPIST

## 2023-09-19 PROCEDURE — 97112 NEUROMUSCULAR REEDUCATION: CPT | Performed by: PHYSICAL THERAPIST

## 2023-09-19 RX ORDER — LABETALOL 100 MG/1
100 TABLET, FILM COATED ORAL 2 TIMES DAILY
Qty: 180 TABLET | Refills: 1 | Status: SHIPPED | OUTPATIENT
Start: 2023-09-19

## 2023-09-19 NOTE — PROGRESS NOTES
PT Evaluation   Today's date: 2023  Patient name: Carly Brown  : 1990  MRN: [id number]  Referring provider: Zaida Evans PT  Dx:   Encounter Diagnosis     ICD-10-CM    1. Chronic left shoulder pain  M25.512     G89.29           Start Time: 0630  Stop Time: 0730  Total time in clinic (min): 60 minutes    Assessment  Assessment details: Carly Brown is a 35 y.o. female who presents with complaints of chronic left shoulder pain  (primary encounter diagnosis). No further referral appears necessary at this time based upon examination results. Patient presents to PT with impaired strength, impaired ROM, decreased flexibility and impaired ability to complete IADLs. Prognosis is good given HEP compliance and PT 2-3x/wk. Positive prognostic indicators include positive attitude toward recovery. Please contact me if you have any questions or recommendations. Thank you for the opportunity to share in Theresa's care. Impairments: abnormal coordination, abnormal muscle firing, abnormal or restricted ROM, activity intolerance, impaired physical strength, lacks appropriate home exercise program, pain with function and poor body mechanics    Symptom irritability: moderateBarriers to therapy: N/A   Understanding of Dx/Px/POC: good   Prognosis: good    Goals  Short Term:  Pt will report decreased levels of pain by at least 2 subjective ratings in 4 weeks  Pt will demonstrate improved ROM by at least 10 degrees in 4 weeks  Pt will demonstrate improved strength by 1/2 grade  Long Term:   Pt will be independent in their HEP in 8 weeks  Pt will be be pain free with IADL's  Pt will demonstrate improved FOTO, > 80         Plan  Plan details: Prognosis above is given PT services 2x/week tapering to 1x/week over the next 3 months and home program adherence.   Patient would benefit from: skilled physical therapy  Planned modality interventions: thermotherapy: hydrocollator packs, cryotherapy, electrical stimulation/Omani stimulation and low level laser therapy  Planned therapy interventions: activity modification, joint mobilization, manual therapy, motor coordination training, neuromuscular re-education, patient education, self care, therapeutic activities, therapeutic exercise, graded activity, home exercise program, functional ROM exercises, flexibility, balance, strengthening and stretching  Frequency: 2x week  Duration in weeks: 4  Plan of Care beginning date: 9/19/2023  Plan of Care expiration date: 10/19/2023  Treatment plan discussed with: patient        Subjective Evaluation    History of Present Illness  Mechanism of injury: Patient has been dealing with L shoulder pain since 3 months ago. She said she is not sure what brought it on. She told PT that she she gets radiating symptoms down the LUE into the pinky. Pain   L Shoulder   Currently 5-6/10  At Best 0/10  At Worst 10/10  Patient described her pain as tightness in the L periscapular region accompanied by achy pain which radiates down into her low back.     AGGS: sleeping   EASES: Advil, Topical, Heat, Machine Gun    Patient's Goal: "I want to be able to live life without pain."          Objective     General Comments:      Shoulder Comments   UQS: decreased sensation in the LUE    Observation: forward head, forward shoulder    Palpation: tenderness noted at CVJ of Ribs 2, 3, 4 and 5; L pec minor TTP    ROM  Cervical AROM   Flexion WNL   Extension limited 50% due to pain   Rotation limited 50% to R due to pain; limited 25% to L due to pain   SB limited 50% bilaterally     Strength   5/5 throughout except L UT/MT/Rhomboids (3-/5)     Special Tests  Compression -   Distraction -   Spurling's R- L-     Segmental Mobility Testing   C1-C2 hypomobile bilaterally   C1-C3 hypomobile L   CSG hypomobile to the L   T/S hypomobile T4   Ribs 1-5 hypomobile on L     Precautions: Universal     Daily Treatment Log  Manuals 9/19            [facility]              T/S G5             Scap Mobs             CS G5  NV            Neuro Re-Ed 9/19            Chin Tucks             Serratus Punches             Cervical Isos             Alt UE/Alt LE             Quad Alt UE                                       Ther Ex 9/19            Prone Ys/Ts/Is             TB 4 Way                                                                                           Ther Activity                                       Gait Training                                       Modalities

## 2023-09-19 NOTE — PROGRESS NOTES
Assessment/Plan:    Chronic constipation  Improved with Linzess    Hypertension  Appears controlled on labetalol  Renal artery doppler pending         Problem List Items Addressed This Visit        Digestive    Chronic constipation     Improved with Linzess            Cardiovascular and Mediastinum    Hypertension - Primary     Appears controlled on labetalol  Renal artery doppler pending         Relevant Medications    labetalol (NORMODYNE) 100 mg tablet         Subjective:      Patient ID: Denver Ireland is a 35 y.o. female. HPI  Here for a follow up  HTN on labetalol 100mg BID  BP at home 120/80s but admits that she has not been checking regularly  She has been under a lot of stress lately    The following portions of the patient's history were reviewed and updated as appropriate: allergies, current medications, past family history, past medical history, past social history, past surgical history and problem list.    Review of Systems   Constitutional: Negative for unexpected weight change. Respiratory: Negative for shortness of breath. Cardiovascular: Negative for chest pain and palpitations. Gastrointestinal: Positive for constipation. Negative for abdominal pain and diarrhea. Neurological: Negative for dizziness and headaches. Objective:      /80 (BP Location: Left arm, Patient Position: Sitting, Cuff Size: Standard)   Pulse 86   Ht 5' 1" (1.549 m)   Wt 53.5 kg (118 lb)   SpO2 99%   BMI 22.30 kg/m²          Physical Exam  Constitutional:       Appearance: She is not ill-appearing. Cardiovascular:      Rate and Rhythm: Regular rhythm. Heart sounds: Normal heart sounds. Pulmonary:      Breath sounds: Normal breath sounds. Abdominal:      Palpations: Abdomen is soft. Tenderness: There is no abdominal tenderness. Musculoskeletal:      Cervical back: Neck supple.    Psychiatric:         Mood and Affect: Mood normal.         Behavior: Behavior normal.

## 2023-09-21 ENCOUNTER — OFFICE VISIT (OUTPATIENT)
Dept: PHYSICAL THERAPY | Facility: OTHER | Age: 33
End: 2023-09-21
Payer: COMMERCIAL

## 2023-09-21 DIAGNOSIS — M25.512 CHRONIC LEFT SHOULDER PAIN: Primary | ICD-10-CM

## 2023-09-21 DIAGNOSIS — G89.29 CHRONIC LEFT SHOULDER PAIN: Primary | ICD-10-CM

## 2023-09-21 PROCEDURE — 97110 THERAPEUTIC EXERCISES: CPT | Performed by: PHYSICAL THERAPIST

## 2023-09-21 PROCEDURE — 97140 MANUAL THERAPY 1/> REGIONS: CPT | Performed by: PHYSICAL THERAPIST

## 2023-09-21 PROCEDURE — 97112 NEUROMUSCULAR REEDUCATION: CPT | Performed by: PHYSICAL THERAPIST

## 2023-09-21 NOTE — PROGRESS NOTES
Daily Note   Today's date: 2023  Patient name: Patito Hernandez  : 1990  MRN: [id number]  Referring provider: Edmundo Shirley PT  Dx:   Encounter Diagnosis     ICD-10-CM    1. Chronic left shoulder pain  M25.512     G89.29         Start Time: 0630  Stop Time: 0730  Total time in clinic (min): 60 minutes    Subjective: Patient reports no change since IE. She reported difficulty with some of the prescribed exercises today. PT provided HEP today. Objective: See treatment diary below    Assessment: Tolerated treatment well. Patient demonstrated fatigue post treatment, exhibited good technique with therapeutic exercises and would benefit from continued PT    Plan: Continue per plan of care.      Precautions: Universal    Daily Treatment Log  Manuals        Scap Mobs [facility]       T/S G5 [facility]       Rib T3 [facility]       GISTM  [facility]       Neuro Re-Ed         Push Up Plus 2 x 10 x 5"        Serratus Punches 2 x 10 x 5" #4       Butterflies 15 x 5" ea                                       Ther Ex        Rows GTB 3 x 10       Low Rows OTB 2 x 10       No Monies  OTB 2 x 10       Thread the Needle 10 x 10" ea                                       Ther Activity                        Gait Training                        Modalities

## 2023-09-26 ENCOUNTER — OFFICE VISIT (OUTPATIENT)
Dept: PHYSICAL THERAPY | Facility: OTHER | Age: 33
End: 2023-09-26
Payer: COMMERCIAL

## 2023-09-26 DIAGNOSIS — G89.29 CHRONIC LEFT SHOULDER PAIN: Primary | ICD-10-CM

## 2023-09-26 DIAGNOSIS — M25.512 CHRONIC LEFT SHOULDER PAIN: Primary | ICD-10-CM

## 2023-09-26 PROCEDURE — 97110 THERAPEUTIC EXERCISES: CPT | Performed by: PHYSICAL THERAPIST

## 2023-09-26 PROCEDURE — 97140 MANUAL THERAPY 1/> REGIONS: CPT | Performed by: PHYSICAL THERAPIST

## 2023-09-26 PROCEDURE — 97112 NEUROMUSCULAR REEDUCATION: CPT | Performed by: PHYSICAL THERAPIST

## 2023-09-26 NOTE — PROGRESS NOTES
Daily Note   Today's date: 2023  Patient name: Thyra Mortimer  : 1990  MRN: [id number]  Referring provider: Brie Perez, PT  Dx:   Encounter Diagnosis     ICD-10-CM    1. Chronic left shoulder pain  M25.512     G89.29         Start Time: 0630  Stop Time: 0730  Total time in clinic (min): 60 minutes    Subjective: Patient reports she does not have any issues while she is moving; she said her biggest issue is sleeping and waking up in the morning. Objective: See treatment diary below    Assessment: Tolerated treatment well. PT added several new exercises. Patient demonstrated fatigue post treatment, exhibited good technique with therapeutic exercises and would benefit from continued PT    Plan: Continue per plan of care.      Precautions: Universal    Daily Treatment Log  Manuals       Scap Mobs [address] [facility]       T/S G5 [address] [facility] [address] [facility]      Rib T3 [address] [place]      GISTM  [address] [facility]       Neuro Re-Ed        Push Up Plus 2 x 10 x 5"        Serratus Punches 2 x 10 x 5" #4       Butterflies 15 x 5" ea       Prone Ys  15 x 5" ea      Prone Ts  15 x 5" ea      Prone Is  15 x 5" ea      MF Quad Alt UE  15 x 5" ea                      Ther Ex       Rows GTB 3 x 10 MTB 20 x 5"      Low Rows OTB 2 x 10 GTB 20 x 5"       No Monies  OTB 2 x 10       Thread the Needle 10 x 10" ea                                       Ther Activity                        Gait Training                        Modalities

## 2023-10-05 ENCOUNTER — OFFICE VISIT (OUTPATIENT)
Dept: PHYSICAL THERAPY | Facility: OTHER | Age: 33
End: 2023-10-05
Payer: COMMERCIAL

## 2023-10-05 DIAGNOSIS — G89.29 CHRONIC LEFT SHOULDER PAIN: Primary | ICD-10-CM

## 2023-10-05 DIAGNOSIS — M25.512 CHRONIC LEFT SHOULDER PAIN: Primary | ICD-10-CM

## 2023-10-05 PROCEDURE — 97110 THERAPEUTIC EXERCISES: CPT | Performed by: PHYSICAL THERAPIST

## 2023-10-05 PROCEDURE — 97112 NEUROMUSCULAR REEDUCATION: CPT | Performed by: PHYSICAL THERAPIST

## 2023-10-05 PROCEDURE — 97140 MANUAL THERAPY 1/> REGIONS: CPT | Performed by: PHYSICAL THERAPIST

## 2023-10-08 NOTE — PROGRESS NOTES
Daily Note   Today's date: 10/8/2023  Patient name: Anai Schwab  : 1990  MRN: [id number]  Referring provider: Bri Wilson, PT  Dx:   Encounter Diagnosis     ICD-10-CM    1. Chronic left shoulder pain  M25.512     G89.29                    Subjective: Patient reports she does not have any issues while she is moving; she said her biggest issue is sleeping and waking up in the morning. Objective: See treatment diary below    Assessment: Tolerated treatment well. PT added several new exercises. Patient demonstrated fatigue post treatment, exhibited good technique with therapeutic exercises and would benefit from continued PT    Plan: Continue per plan of care.      Precautions: Universal    Daily Treatment Log  Manuals       Scap Mobs [address] [facility]       T/S G5 [address] [facility] [address] [facility]      Rib T3 [address] [place]      GISTM  [address] [facility]       Neuro Re-Ed        Push Up Plus 2 x 10 x 5"        Serratus Punches 2 x 10 x 5" #4       Butterflies 15 x 5" ea       Prone Ys  15 x 5" ea      Prone Ts  15 x 5" ea      Prone Is  15 x 5" ea      MF Quad Alt UE  15 x 5" ea                      Ther Ex       Rows GTB 3 x 10 MTB 20 x 5"      Low Rows OTB 2 x 10 GTB 20 x 5"       No Monies  OTB 2 x 10       Thread the Needle 10 x 10" ea                                       Ther Activity                        Gait Training                        Modalities

## 2023-10-10 ENCOUNTER — APPOINTMENT (OUTPATIENT)
Dept: PHYSICAL THERAPY | Facility: OTHER | Age: 33
End: 2023-10-10
Payer: COMMERCIAL

## 2023-10-12 ENCOUNTER — APPOINTMENT (OUTPATIENT)
Dept: PHYSICAL THERAPY | Facility: OTHER | Age: 33
End: 2023-10-12
Payer: COMMERCIAL

## 2023-10-12 ENCOUNTER — OFFICE VISIT (OUTPATIENT)
Dept: PHYSICAL THERAPY | Facility: OTHER | Age: 33
End: 2023-10-12
Payer: COMMERCIAL

## 2023-10-12 DIAGNOSIS — G89.29 CHRONIC LEFT SHOULDER PAIN: Primary | ICD-10-CM

## 2023-10-12 DIAGNOSIS — M25.512 CHRONIC LEFT SHOULDER PAIN: Primary | ICD-10-CM

## 2023-10-12 PROCEDURE — 97112 NEUROMUSCULAR REEDUCATION: CPT | Performed by: PHYSICAL THERAPIST

## 2023-10-12 PROCEDURE — 97110 THERAPEUTIC EXERCISES: CPT | Performed by: PHYSICAL THERAPIST

## 2023-10-12 PROCEDURE — 97140 MANUAL THERAPY 1/> REGIONS: CPT | Performed by: PHYSICAL THERAPIST

## 2023-10-16 NOTE — PROGRESS NOTES
Daily Note   Today's date: 10/12/2023  Patient name: Jaky Skinner  : 1990  MRN: [id number]  Referring provider: Levar Vallecillo PT  Dx:   Encounter Diagnosis     ICD-10-CM    1. Chronic left shoulder pain  M25.512     G89.29         Start Time: 0630  Stop Time: 0730  Total time in clinic (min): 60 minutes    Subjective: Patient reports she is still having the same issues as before. She said she still notices her pain the most upon awakening. Objective: See treatment diary below    Assessment: Tolerated treatment well. PT  performed MFDc today. She reported relief. Monitor response NV. PT also added sevreal new exercises. Patient demonstrated fatigue post treatment, exhibited good technique with therapeutic exercises and would benefit from continued PT    Plan: Continue per plan of care.      Precautions: Universal  POC expires Unit limit Auth Expiration date PT/OT + Visit Limit?    4  12                             Visit/Unit Tracking  AUTH Status:  Date 10/5 10/12             9/19- Used 4 5              Remaining  8 7               Daily Treatment Log  Manuals 9/21 9/26 10/5 10/12    Scap Mobs [address] [facility] [address] [facility]    T/S G5 [address] [facility] [address] [facility] [address] [facility] [address] [facility]    Rib T3 [address] [place] [address] [facility] [address] [facility]    GISTM  [address] [facility]   [address] [facility]    Neuro Re-Ed  9/21 9/26 10/5 10/12    Push Up Plus 2 x 10 x 5"   2 x 10 x 5" 3 x 10 x 5"     Serratus Punches 2 x 10 x 5" #4  2 x 10 x 5" #5 3 x 10 x 5" #5    Butterflies 15 x 5" ea  15 x 5" ea 20 x 5" ea    Prone Ys  15 x 5" ea  #1 15 x 5" ea    Prone Ts  15 x 5" ea  #1 15 x 5" ea    Prone Is  15 x 5" ea  #1 15 x 5" ea    MF Quad Alt UE  15 x 5" ea 2 x 10 x 5" ea 2 x 10 x 5" ea                    Ther Ex 9/21 9/26 10/5 10/12    Rows GTB 3 x 10 MTB 20 x 5" MTB 30 x 5"  MTB 30 x 5"     Low Rows OTB 2 x 10 GTB 20 x 5"  GTB 30 x 5"  GTB 30 x 5"     No Monies  OTB 2 x 10       Thread the Needle 10 x 10" ea  10 x 10" ea 10 x 10" ea    TB ER   OTB 30x  OTB 30x     TB IR   OTB 30x  OTB 30x     Marlon Stager    PTB 15x    Row2OH Press    PTB 15x    Ther Activity                        Gait Training                        Modalities    10/12    EPAT    Multiple Areas  3.5 bars 15 Hz  2000 pulses   6x

## 2023-10-17 ENCOUNTER — APPOINTMENT (OUTPATIENT)
Dept: PHYSICAL THERAPY | Facility: OTHER | Age: 33
End: 2023-10-17
Payer: COMMERCIAL

## 2023-10-19 ENCOUNTER — APPOINTMENT (OUTPATIENT)
Dept: PHYSICAL THERAPY | Facility: OTHER | Age: 33
End: 2023-10-19
Payer: COMMERCIAL

## 2023-10-24 ENCOUNTER — OFFICE VISIT (OUTPATIENT)
Dept: PHYSICAL THERAPY | Facility: OTHER | Age: 33
End: 2023-10-24
Payer: COMMERCIAL

## 2023-10-24 DIAGNOSIS — M25.512 CHRONIC LEFT SHOULDER PAIN: Primary | ICD-10-CM

## 2023-10-24 DIAGNOSIS — G89.29 CHRONIC LEFT SHOULDER PAIN: Primary | ICD-10-CM

## 2023-10-24 PROCEDURE — 97110 THERAPEUTIC EXERCISES: CPT | Performed by: PHYSICAL THERAPIST

## 2023-10-24 PROCEDURE — 97112 NEUROMUSCULAR REEDUCATION: CPT | Performed by: PHYSICAL THERAPIST

## 2023-10-24 PROCEDURE — 97140 MANUAL THERAPY 1/> REGIONS: CPT | Performed by: PHYSICAL THERAPIST

## 2023-10-26 ENCOUNTER — APPOINTMENT (OUTPATIENT)
Dept: PHYSICAL THERAPY | Facility: OTHER | Age: 33
End: 2023-10-26
Payer: COMMERCIAL

## 2023-10-26 NOTE — PROGRESS NOTES
Daily Note   Today's date: 10/24/2023  Patient name: Patito Hernandez  : 1990  MRN: [id number]  Referring provider: Edmundo Shirley PT  Dx:   Encounter Diagnosis     ICD-10-CM    1. Chronic left shoulder pain  M25.512     G89.29         1 on 1 entire duration  Start Time: 0630  Stop Time: 0715  Total time in clinic (min): 45 minutes    Subjective: Patient reports she went for a massage and she feels much better. She also reported getting her TPR Cane and performing trigger point releases on a regular basis. Objective: See treatment diary below    Assessment: Tolerated treatment well. PT  performed MFDc today. She reported relief. NV perform RE. PT also emphasized strengthening today. Patient demonstrated fatigue post treatment, exhibited good technique with therapeutic exercises and would benefit from continued PT    Plan: Continue per plan of care.      Precautions: Universal  POC expires Unit limit Auth Expiration date PT/OT + Visit Limit?    12                             Visit/Unit Tracking  AUTH Status:  Date 10/5 10/12 10/24            9/19- Used 4 5 6             Remaining  8 7 6              Daily Treatment Log  Manuals 9/21 9/26 10/5 10/12 10/24   Scap Mobs [address] [facility]  [address] [facility] [address] [facility] [address] [facility]   T/S G5 [address] [facility] [address] [facility] [address] [facility] [address] [facility] [address] [facility]   Rib T3 [address] [place] [address] [place] [address] [facility]   GISTM  [address] [facility]   [address] [facility] [address] [facility]   Neuro Re-Ed  9/21 9/26 10/5 10/12 10/24   Push Up Plus 2 x 10 x 5"   2 x 10 x 5" 3 x 10 x 5"  3 x 10 x 5"    Serratus Punches 2 x 10 x 5" #4  2 x 10 x 5" #5 3 x 10 x 5" #5 3 x 10 x 5" #5   Butterflies 15 x 5" ea  15 x 5" ea 20 x 5" ea    Prone Ys  15 x 5" ea  #1 15 x 5" ea    Prone Ts  15 x 5" ea  #1 15 x 5" ea    Prone Is  15 x 5" ea  #1 15 x 5" ea    MF Quad Alt UE  15 x 5" ea 2 x 10 x 5" ea 2 x 10 x 5" ea 3 x 10 x 5" ea                   Ther Ex 9/21 9/26 10/5 10/12 10/24   Rows GTB 3 x 10 MTB 20 x 5" MTB 30 x 5"  MTB 30 x 5"  MTB 30 x 5"    Low Rows OTB 2 x 10 GTB 20 x 5"  GTB 30 x 5"  GTB 30 x 5"  GTB 30 x 5"    No Monies  OTB 2 x 10       Thread the Needle 10 x 10" ea  10 x 10" ea 10 x 10" ea    TB ER   OTB 30x  OTB 30x  OTB 30x    TB IR   OTB 30x  OTB 30x  OTB 30x    [place]    PTB 15x PTB 15x   Row2OH Press    PTB 15x PTB 15x   TB Ts      OTB 15x                   Ther Activity                        Gait Training                        Modalities    10/12 10/24   EPAT    Multiple Areas  3.5 bars 15 Hz  2000 pulses   6x  Multiple Areas  3.5 bars 15 Hz  2000 pulses   2x

## 2023-10-31 ENCOUNTER — OFFICE VISIT (OUTPATIENT)
Dept: PHYSICAL THERAPY | Facility: OTHER | Age: 33
End: 2023-10-31
Payer: COMMERCIAL

## 2023-10-31 DIAGNOSIS — G89.29 CHRONIC LEFT SHOULDER PAIN: Primary | ICD-10-CM

## 2023-10-31 DIAGNOSIS — M25.512 CHRONIC LEFT SHOULDER PAIN: Primary | ICD-10-CM

## 2023-10-31 PROCEDURE — 97140 MANUAL THERAPY 1/> REGIONS: CPT | Performed by: PHYSICAL THERAPIST

## 2023-10-31 PROCEDURE — 97112 NEUROMUSCULAR REEDUCATION: CPT | Performed by: PHYSICAL THERAPIST

## 2023-10-31 PROCEDURE — 97110 THERAPEUTIC EXERCISES: CPT | Performed by: PHYSICAL THERAPIST

## 2023-11-05 NOTE — PROGRESS NOTES
Daily Note   Today's date: 10/24/2023  Patient name: Valarie Saravia  : 1990  MRN: [id number]  Referring provider: Pancho Luis, PT  Dx:   Encounter Diagnosis     ICD-10-CM    1. Chronic left shoulder pain  M25.512     G89.29         1 on 1 entire duration             Subjective: Patient reports she went for a massage and she feels much better. She also reported getting her TPR Cane and performing trigger point releases on a regular basis. Objective: See treatment diary below    Assessment: Tolerated treatment well. PT  performed MFDc today. She reported relief. NV perform RE. PT also emphasized strengthening today. Patient demonstrated fatigue post treatment, exhibited good technique with therapeutic exercises and would benefit from continued PT    Plan: Continue per plan of care.      Precautions: Universal  POC expires Unit limit Auth Expiration date PT/OT + Visit Limit?    4  12                             Visit/Unit Tracking  AUTH Status:  Date 10/5 10/12 10/24            9/19- Used 4 5 6             Remaining  8 7 6              Daily Treatment Log  Manuals 9/21 9/26 10/5 10/12 10/24   Scap Mobs [address] [facility]  [address] [facility] [address] [facility] [address] [facility]   T/S G5 [address] [facility] [address] [facility] [address] [facility] [address] [facility] [address] [facility]   Rib T3 [address] [place] [address] [place] [address] [facility]   GISTM  [address] [facility]   [address] [facility] [address] [facility]   Neuro Re-Ed  9/21 9/26 10/5 10/12 10/24   Push Up Plus 2 x 10 x 5"   2 x 10 x 5" 3 x 10 x 5"  3 x 10 x 5"    Serratus Punches 2 x 10 x 5" #4  2 x 10 x 5" #5 3 x 10 x 5" #5 3 x 10 x 5" #5   Butterflies 15 x 5" ea  15 x 5" ea 20 x 5" ea    Prone Ys  15 x 5" ea  #1 15 x 5" ea    Prone Ts  15 x 5" ea  #1 15 x 5" ea    Prone Is  15 x 5" ea  #1 15 x 5" ea    MF Quad Alt UE  15 x 5" ea 2 x 10 x 5" ea 2 x 10 x 5" ea 3 x 10 x 5" ea                   Ther Ex 9/21 9/26 10/5 10/12 10/24   Rows GTB 3 x 10 MTB 20 x 5" MTB 30 x 5"  MTB 30 x 5"  MTB 30 x 5"    Low Rows OTB 2 x 10 GTB 20 x 5"  GTB 30 x 5"  GTB 30 x 5"  GTB 30 x 5"    No Monies  OTB 2 x 10       Thread the Needle 10 x 10" ea  10 x 10" ea 10 x 10" ea    TB ER   OTB 30x  OTB 30x  OTB 30x    TB IR   OTB 30x  OTB 30x  OTB 30x    [place]    PTB 15x PTB 15x   Row2OH Press    PTB 15x PTB 15x   TB Ts      OTB 15x                   Ther Activity                        Gait Training                        Modalities    10/12 10/24   EPAT    Multiple Areas  3.5 bars 15 Hz  2000 pulses   6x  Multiple Areas  3.5 bars 15 Hz  2000 pulses   2x

## 2024-03-17 LAB
BUN SERPL-MCNC: 11 MG/DL (ref 7–25)
BUN/CREAT SERPL: NORMAL (CALC) (ref 6–22)
CALCIUM SERPL-MCNC: 9.6 MG/DL (ref 8.6–10.2)
CHLORIDE SERPL-SCNC: 106 MMOL/L (ref 98–110)
CO2 SERPL-SCNC: 26 MMOL/L (ref 20–32)
CREAT SERPL-MCNC: 0.65 MG/DL (ref 0.5–0.97)
GFR/BSA.PRED SERPLBLD CYS-BASED-ARV: 118 ML/MIN/1.73M2
GLUCOSE SERPL-MCNC: 83 MG/DL (ref 65–99)
POTASSIUM SERPL-SCNC: 4.7 MMOL/L (ref 3.5–5.3)
SODIUM SERPL-SCNC: 141 MMOL/L (ref 135–146)

## 2024-03-26 ENCOUNTER — OFFICE VISIT (OUTPATIENT)
Dept: INTERNAL MEDICINE CLINIC | Facility: CLINIC | Age: 34
End: 2024-03-26
Payer: COMMERCIAL

## 2024-03-26 VITALS
SYSTOLIC BLOOD PRESSURE: 119 MMHG | HEIGHT: 61 IN | RESPIRATION RATE: 16 BRPM | HEART RATE: 89 BPM | WEIGHT: 126.2 LBS | BODY MASS INDEX: 23.83 KG/M2 | DIASTOLIC BLOOD PRESSURE: 78 MMHG | OXYGEN SATURATION: 98 %

## 2024-03-26 DIAGNOSIS — K59.09 CHRONIC CONSTIPATION: ICD-10-CM

## 2024-03-26 DIAGNOSIS — Z23 NEED FOR VACCINATION: ICD-10-CM

## 2024-03-26 DIAGNOSIS — I10 PRIMARY HYPERTENSION: Primary | ICD-10-CM

## 2024-03-26 DIAGNOSIS — M54.12 CERVICAL RADICULOPATHY: ICD-10-CM

## 2024-03-26 PROCEDURE — 90715 TDAP VACCINE 7 YRS/> IM: CPT

## 2024-03-26 PROCEDURE — 90471 IMMUNIZATION ADMIN: CPT

## 2024-03-26 PROCEDURE — 99214 OFFICE O/P EST MOD 30 MIN: CPT | Performed by: INTERNAL MEDICINE

## 2024-03-26 RX ORDER — LABETALOL 100 MG/1
100 TABLET, FILM COATED ORAL 2 TIMES DAILY
Qty: 180 TABLET | Refills: 3 | Status: SHIPPED | OUTPATIENT
Start: 2024-03-26

## 2024-03-26 RX ORDER — LINACLOTIDE 145 UG/1
145 CAPSULE, GELATIN COATED ORAL DAILY
Qty: 90 CAPSULE | Refills: 3 | Status: SHIPPED | OUTPATIENT
Start: 2024-03-26

## 2024-03-26 NOTE — ASSESSMENT & PLAN NOTE
Still gets constipated but sometimes has diarrhea  Prefers to stay on Linzess 145mcg     normal (ped)...

## 2024-03-26 NOTE — PROGRESS NOTES
"Name: Theresa Borden      : 1990      MRN: [id number]  Encounter Provider: Lea Reyes, MD  Encounter Date: 3/26/2024   [facility]    Assessment & Plan     1. Primary hypertension  Assessment & Plan:  Admits to missing morning dose at times but overall controlled on labetalol    Orders:  -     labetalol (NORMODYNE) 100 mg tablet; Take 1 tablet (100 mg total) by mouth 2 (two) times a day    2. Chronic constipation  Assessment & Plan:  Still gets constipated but sometimes has diarrhea  Prefers to stay on Linzess 145mcg      Orders:  -     linaCLOtide (Linzess) 145 MCG CAPS; Take 1 capsule (145 mcg total) by mouth in the morning    3. Cervical radiculopathy  Comments:  Home exercise program from previous PT recommended  Declined gabapentin at this time  Call if symptoms do not improve in 4 weeks    4. Need for vaccination  -     Tdap vaccine greater than or equal to 8yo IM           Subjective      Here for a follow up  HTN on labetalol which she takes BID  Admits to missing it some mornings  Also on Linzess 145mcg which has helped. Some days she has diarrhea but still gets constipated   3 weeks of left arm  tingling like \"static\"  Mild neck soreness; shoulder ROM is normal  No weakness in the arms  Plays volleyball a few days a week without issues      Review of Systems   Constitutional:  Negative for fever.   Respiratory:  Negative for shortness of breath.    Cardiovascular:  Negative for chest pain and palpitations.   Gastrointestinal:  Positive for constipation and diarrhea. Negative for abdominal pain.   Genitourinary:  Negative for menstrual problem.   Musculoskeletal:  Positive for neck pain.   Skin:  Positive for wound (tiny puncture wound left shoulder).       Current Outpatient Medications on File Prior to Visit   Medication Sig   • BORIC ACID EX Insert into the vagina   • cholecalciferol (VITAMIN D3) 1,000 units tablet Take 1 tablet (1,000 Units total) by " "mouth daily   • Ibuprofen 200 MG CAPS Take by mouth   • loratadine (CLARITIN) 10 mg tablet Take 10 mg by mouth daily   • norgestimate-ethinyl estradiol (Tri-Lo-Sprintec) 0.18/0.215/0.25 MG-25 MCG per tablet Take 1 tablet by mouth daily   • [DISCONTINUED] labetalol (NORMODYNE) 100 mg tablet Take 1 tablet (100 mg total) by mouth 2 (two) times a day   • [DISCONTINUED] Linzess 145 MCG CAPS TAKE 1 CAPSULE(145 MCG) BY MOUTH DAILY 30 MINUTES BEFORE FIRST MEAL OF THE DAY       Objective     /78   Pulse 89   Resp 16   Ht 5' 1\" (1.549 m)   Wt 57.2 kg (126 lb 3.2 oz)   SpO2 98%   BMI 23.85 kg/m²     Physical Exam  Constitutional:       Appearance: She is well-developed. She is not ill-appearing.   Eyes:      Conjunctiva/sclera: Conjunctivae normal.   Cardiovascular:      Rate and Rhythm: Normal rate and regular rhythm.      Heart sounds: Normal heart sounds. No murmur heard.  Pulmonary:      Effort: Pulmonary effort is normal. No respiratory distress.      Breath sounds: Normal breath sounds. No wheezing or rales.   Abdominal:      General: There is no distension.      Palpations: Abdomen is soft. There is no mass.      Tenderness: There is no abdominal tenderness. There is no guarding or rebound.   Musculoskeletal:      Left shoulder: Normal range of motion.      Cervical back: Neck supple. No bony tenderness. No pain with movement. Normal range of motion.      Right lower leg: No edema.      Left lower leg: No edema.   Skin:     Findings: Lesion (tiny puncture wound left shoulder) present.   Neurological:      Mental Status: She is alert and oriented to person, place, and time.      Comments: 5/5 both UE   Psychiatric:         Mood and Affect: Mood normal.         Behavior: Behavior normal.         Thought Content: Thought content normal.         Judgment: Judgment normal.       Lea Reyes, MD    "

## 2024-05-20 ENCOUNTER — TELEPHONE (OUTPATIENT)
Age: 34
End: 2024-05-20

## 2024-05-20 NOTE — TELEPHONE ENCOUNTER
Pt called to say she pulled off a tick this morning that may have been there for the past two days. It was attached but not imbedded. The site is red and a little bit itchy but she has no other sxs. She will make an appt if needed otherwise she is just looking for advise. I called the pt back to get the location of the bite but had to leave a message. Please advise.

## 2024-05-20 NOTE — TELEPHONE ENCOUNTER
Offer an appointment with any available provider today or tomorrow to discuss if she needs an antibiotic for the tick bite

## 2024-05-21 ENCOUNTER — OFFICE VISIT (OUTPATIENT)
Dept: INTERNAL MEDICINE CLINIC | Facility: CLINIC | Age: 34
End: 2024-05-21
Payer: COMMERCIAL

## 2024-05-21 VITALS
HEART RATE: 87 BPM | DIASTOLIC BLOOD PRESSURE: 84 MMHG | SYSTOLIC BLOOD PRESSURE: 128 MMHG | BODY MASS INDEX: 23.54 KG/M2 | OXYGEN SATURATION: 97 % | WEIGHT: 124.6 LBS

## 2024-05-21 DIAGNOSIS — S40.861A TICK BITE OF RIGHT AXILLARY REGION, INITIAL ENCOUNTER: Primary | ICD-10-CM
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
## 2024-05-21 PROBLEM — S20.361A TICK BITE OF RIGHT FRONT WALL OF THORAX: Status: ACTIVE | Noted: 2024-05-21

## 2024-05-21 PROCEDURE — 99213 OFFICE O/P EST LOW 20 MIN: CPT | Performed by: INTERNAL MEDICINE

## 2024-05-21 RX ORDER — DOXYCYCLINE 100 MG/1
200 TABLET ORAL ONCE
Qty: 2 TABLET | Refills: 0 | Status: SHIPPED | OUTPATIENT
Start: 2024-05-21 | End: 2024-05-21

## 2024-05-21 NOTE — PROGRESS NOTES
Name: Theresa Borden      : 1990      MRN: [id number]  Encounter Provider: Nam Rivera MD  Encounter Date: 2024   Enc[facility] department: [facility]    Assessment & Plan     1. Tick bite of right axillary region, initial encounter  -     doxycycline (ADOXA) 100 MG tablet; Take 2 tablets (200 mg total) by mouth 1 (one) time for 1 dose  The patient is a candidate for prophylactic doxycycline since it appears the tick was attached for at least 36 hours and she is now presenting within 72 hours of tick removal.  The picture of the tick on the patient's phone appears to be consistent with a deer tick.  She will be given a one-time dose of doxycycline 200 mg.  Reviewed red flag signs/symptoms that would warrant reevaluation.       Subjective      HPI  Patient presents today as an acute visit.  She reports yesterday morning she removed an engorged tick next to the outer portion of her right breast.  She believes she likely picked up the tick while on a walk in the woods this past Saturday.  Yesterday morning before getting in the shower she happened to see the tick in the mirror and removed it with a pair tweezers.  She denies any fevers, chills or rash.    All other systems negative except for pertinent findings noted in HPI.       Current Outpatient Medications on File Prior to Visit   Medication Sig   • BORIC ACID EX Insert into the vagina   • cholecalciferol (VITAMIN D3) 1,000 units tablet Take 1 tablet (1,000 Units total) by mouth daily   • Ibuprofen 200 MG CAPS Take by mouth   • labetalol (NORMODYNE) 100 mg tablet Take 1 tablet (100 mg total) by mouth 2 (two) times a day   • linaCLOtide (Linzess) 145 MCG CAPS Take 1 capsule (145 mcg total) by mouth in the morning   • loratadine (CLARITIN) 10 mg tablet Take 10 mg by mouth daily   • norgestimate-ethinyl estradiol (Tri-Lo-Sprintec) 0.18/0.215/0.25 MG-25 MCG per tablet Take 1 tablet by mouth daily       Objective     BP  128/84 (BP Location: Left arm, Patient Position: Sitting, Cuff Size: Standard)   Pulse 87   Wt 56.5 kg (124 lb 9.6 oz)   SpO2 97%   BMI 23.54 kg/m²     BP Readings from Last 3 Encounters:   05/21/24 128/84   03/26/24 119/78   09/19/23 122/80        Wt Readings from Last 3 Encounters:   05/21/24 56.5 kg (124 lb 9.6 oz)   03/26/24 57.2 kg (126 lb 3.2 oz)   09/19/23 53.5 kg (118 lb)       Physical Exam    General: NAD  HEENT: NCAT, EOMI, normal conjunctiva  Cardiovascular: RRR, normal S1 and S2, no m/r/g  Pulmonary: Normal respiratory effort, no wheezes, rales or rhonchi  Extremities: No lower extremity edema  Skin: Small erythematous papule in right axillary region, normal skin color, no rashes     Nam Rivera MD

## 2024-07-29 DIAGNOSIS — Z01.419 WOMEN'S ANNUAL ROUTINE GYNECOLOGICAL EXAMINATION: ICD-10-CM

## 2024-07-29 DIAGNOSIS — Z30.41 ENCOUNTER FOR SURVEILLANCE OF CONTRACEPTIVE PILLS: ICD-10-CM

## 2024-07-29 RX ORDER — NORGESTIMATE AND ETHINYL ESTRADIOL 7DAYSX3 LO
1 KIT ORAL DAILY
Qty: 90 TABLET | Refills: 1 | Status: SHIPPED | OUTPATIENT
Start: 2024-07-29

## 2024-08-07 ENCOUNTER — ANNUAL EXAM (OUTPATIENT)
Dept: GYNECOLOGY | Facility: CLINIC | Age: 34
End: 2024-08-07
Payer: COMMERCIAL

## 2024-08-07 VITALS — HEIGHT: 61 IN | BODY MASS INDEX: 23.68 KG/M2 | WEIGHT: 125.4 LBS

## 2024-08-07 DIAGNOSIS — Z01.419 ENCOUNTER FOR ANNUAL ROUTINE GYNECOLOGICAL EXAMINATION: Primary | ICD-10-CM

## 2024-08-07 DIAGNOSIS — K59.00 CONSTIPATION, UNSPECIFIED CONSTIPATION TYPE: ICD-10-CM

## 2024-08-07 DIAGNOSIS — Z30.41 ENCOUNTER FOR SURVEILLANCE OF CONTRACEPTIVE PILLS: ICD-10-CM

## 2024-08-07 DIAGNOSIS — I10 ESSENTIAL HYPERTENSION: ICD-10-CM

## 2024-08-07 PROCEDURE — S0612 ANNUAL GYNECOLOGICAL EXAMINA: HCPCS | Performed by: OBSTETRICS & GYNECOLOGY

## 2024-08-07 NOTE — PROGRESS NOTES
"Assessment   Annual well woman exam  Contraceptive management  No new GYN complaints or concerns  Essential hypertension  Constipation        Plan   Renew OCPs  Normal Pap in , next Pap due in    All questions answered.  Breast self exam technique reviewed and patient encouraged to perform self-exam monthly.  Diagnosis explained in detail, including differential.  Discussed healthy lifestyle modifications.     Subjective here for annual exam     Theresa Borden is a 34 y.o. female who presents for annual exam. Periods are regular every 28-30 days, lasting 5 days. Dysmenorrhea:none. Cyclic symptoms include none. No intermenstrual bleeding, spotting, or discharge. The patient reports that there is not domestic violence in her life.     Current contraception: OCP (estrogen/progesterone)  History of abnormal Pap smear: no  Family history of uterine or ovarian cancer: no  Regular self breast exam: yes  History of abnormal mammogram: no  Family history of breast cancer: no  History of abnormal lipids: no  Menstrual History:  OB History          0    Para   0    Term   0       0    AB   0    Living   0         SAB   0    IAB   0    Ectopic   0    Multiple   0    Live Births   0                Menarche age: 12  Patient's last menstrual period was 07/10/2024.     Review of Systems  Pertinent items are noted in HPI.      Objective no acute distress   Ht 5' 1\" (1.549 m)   Wt 56.9 kg (125 lb 6.4 oz)   LMP 07/10/2024   BMI 23.69 kg/m²     General:   alert and oriented, in no acute distress, alert, appears stated age, and cooperative   Heart: regular rate and rhythm, S1, S2 normal, no murmur, click, rub or gallop   Lungs: clear to auscultation bilaterally   Abdomen: soft, non-tender, without masses or organomegaly   Vulva: normal, Bartholin's, Urethra, [place]'s normal, female escutcheon   Vagina: normal mucosa, normal discharge, no palpable nodules   Cervix: anteverted, no cervical motion tenderness, no " lesions, and nulliparous appearance   Uterus: normal size, anteverted, mobile, non-tender, normal shape and consistency   Adnexa: normal adnexa and no mass, fullness, tenderness   Bilateral breast exam in the sitting and supine position with chaperone present, no visible or palpable breast lesions identified.  No breast masses noted.    No supraclavicular or axillary lymphadenopathy noted.  No nipple discharge.   Reviewed self-breast exam techniques.   Rectal exam,  deferred

## 2024-10-04 ENCOUNTER — OFFICE VISIT (OUTPATIENT)
Dept: INTERNAL MEDICINE CLINIC | Facility: CLINIC | Age: 34
End: 2024-10-04
Payer: COMMERCIAL

## 2024-10-04 VITALS
BODY MASS INDEX: 23.49 KG/M2 | HEIGHT: 61 IN | OXYGEN SATURATION: 99 % | WEIGHT: 124.4 LBS | DIASTOLIC BLOOD PRESSURE: 80 MMHG | SYSTOLIC BLOOD PRESSURE: 110 MMHG | HEART RATE: 82 BPM | TEMPERATURE: 97.3 F

## 2024-10-04 DIAGNOSIS — I10 HYPERTENSION, UNSPECIFIED TYPE: ICD-10-CM

## 2024-10-04 DIAGNOSIS — K58.1 IRRITABLE BOWEL SYNDROME WITH CONSTIPATION: ICD-10-CM

## 2024-10-04 DIAGNOSIS — Z00.00 ANNUAL PHYSICAL EXAM: Primary | ICD-10-CM
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
## 2024-10-04 PROBLEM — S20.361A TICK BITE OF RIGHT FRONT WALL OF THORAX: Status: RESOLVED | Noted: 2024-05-21 | Resolved: 2024-10-04

## 2024-10-04 PROCEDURE — 99395 PREV VISIT EST AGE 18-39: CPT | Performed by: INTERNAL MEDICINE

## 2024-10-04 RX ORDER — AMITRIPTYLINE HYDROCHLORIDE 10 MG/1
10 TABLET ORAL
Qty: 30 TABLET | Refills: 2 | Status: SHIPPED | OUTPATIENT
Start: 2024-10-04

## 2024-10-04 NOTE — PATIENT INSTRUCTIONS
"Patient Education     Routine physical for adults   The Basics   Written by the doctors and editors at [facility]   What is a physical? -- A physical is a routine visit, or \"check-up,\" with your doctor. You might also hear it called a \"wellness visit\" or \"preventive visit.\"  During each visit, the doctor will:   Ask about your physical and mental health   Ask about your habits, behaviors, and lifestyle   Do an exam   Give you vaccines if needed   Talk to you about any medicines you take   Give advice about your health   Answer your questions  Getting regular check-ups is an important part of taking care of your health. It can help your doctor find and treat any problems you have. But it's also important for preventing health problems.  A routine physical is different from a \"sick visit.\" A sick visit is when you see a doctor because of a health concern or problem. Since physicals are scheduled ahead of time, you can think about what you want to ask the doctor.  How often should I get a physical? -- It depends on your age and health. In general, for people age 21 years and older:   If you are younger than 50 years, you might be able to get a physical every 3 years.   If you are 50 years or older, your doctor might recommend a physical every year.  If you have an ongoing health condition, like diabetes or high blood pressure, your doctor will probably want to see you more often.  What happens during a physical? -- In general, each visit will include:   Physical exam - The doctor or nurse will check your height, weight, heart rate, and blood pressure. They will also look at your eyes and ears. They will ask about how you are feeling and whether you have any symptoms that bother you.   Medicines - It's a good idea to bring a list of all the medicines you take to each doctor visit. Your doctor will talk to you about your medicines and answer any questions. Tell them if you are having any side effects that bother you. You " "should also tell them if you are having trouble paying for any of your medicines.   Habits and behaviors - This includes:   Your diet   Your exercise habits   Whether you smoke, drink alcohol, or use drugs   Whether you are sexually active   Whether you feel safe at home  Your doctor will talk to you about things you can do to improve your health and lower your risk of health problems. They will also offer help and support. For example, if you want to quit smoking, they can give you advice and might prescribe medicines. If you want to improve your diet or get more physical activity, they can help you with this, too.   Lab tests, if needed - The tests you get will depend on your age and situation. For example, your doctor might want to check your:   Cholesterol   Blood sugar   Iron level   Vaccines - The recommended vaccines will depend on your age, health, and what vaccines you already had. Vaccines are very important because they can prevent certain serious or deadly infections.   Discussion of screening - \"Screening\" means checking for diseases or other health problems before they cause symptoms. Your doctor can recommend screening based on your age, risk, and preferences. This might include tests to check for:   Cancer, such as breast, prostate, cervical, ovarian, colorectal, prostate, lung, or skin cancer   Sexually transmitted infections, such as chlamydia and gonorrhea   Mental health conditions like depression and anxiety  Your doctor will talk to you about the different types of screening tests. They can help you decide which screenings to have. They can also explain what the results might mean.   Answering questions - The physical is a good time to ask the doctor or nurse questions about your health. If needed, they can refer you to other doctors or specialists, too.  Adults older than 65 years often need other care, too. As you get older, your doctor will talk to you about:   How to prevent falling at " home   Hearing or vision tests   Memory testing   How to take your medicines safely   Making sure that you have the help and support you need at home  All topics are updated as new evidence becomes available and our peer review process is complete.  This topic retrieved from [employer] on: May 02, 2024.  Topic 701631 Version 1.0  Release: 32.4.3 - C32.122  © 2024 UpToDate, Inc. and/or its affiliates. All rights reserved.  Consumer Information Use and Disclaimer   Disclaimer: This generalized information is a limited summary of diagnosis, treatment, and/or medication information. It is not meant to be comprehensive and should be used as a tool to help the user understand and/or assess potential diagnostic and treatment options. It does NOT include all information about conditions, treatments, medications, side effects, or risks that may apply to a specific patient. It is not intended to be medical advice or a substitute for the medical advice, diagnosis, or treatment of a health care provider based on the health care provider's examination and assessment of a patient's specific and unique circumstances. Patients must speak with a health care provider for complete information about their health, medical questions, and treatment options, including any risks or benefits regarding use of medications. This information does not endorse any treatments or medications as safe, effective, or approved for treating a specific patient. UpToDate, Inc. and its affiliates disclaim any warranty or liability relating to this information or the use thereof.The use of this information is governed by the Terms of Use, available at https://www.woltersSoftware Cellular Networkuwer.com/en/know/clinical-effectiveness-terms. 2024© UpToDate, Inc. and its affiliates and/or licensors. All rights reserved.  Copyright   © 2024 UpToDate, Inc. and/or its affiliates. All rights reserved.

## 2024-10-04 NOTE — ASSESSMENT & PLAN NOTE
Controlled on labetalol.  She has yet to schedule the renal artery dopplers and I reminded her to do so.

## 2024-10-04 NOTE — PROGRESS NOTES
Adult Annual Physical  Name: Theresa Borden      : 1990      MRN: [id number]  Encounter Provider: Lea Reyes, MD  Encounter Date: 10/4/2024   Encounter department: [facility]    Assessment & Plan  Annual physical exam         Irritable bowel syndrome with constipation  She has had extensive workup for this at [facility] including colonoscopies.  The last time she saw gastroenterology at [facility], a breath test was discussed.  I would like her to reestablish with gastro and neurology here.  She still experiences constipation on the current dose of Linzess but some days and more frequently experiences urgency and large loose bowel movements.  We agreed to continue the Linzess at the current dose.  I would like her to start amitriptyline at a low dose.  We can increase in the next 3 to 4 weeks if needed.  She can reach out if she is ready to increase the dose.    Orders:    Ambulatory Referral to Gastroenterology; Future    amitriptyline (ELAVIL) 10 mg tablet; Take 1 tablet (10 mg total) by mouth daily at bedtime    Hypertension, unspecified type  Controlled on labetalol.  She has yet to schedule the renal artery dopplers and I reminded her to do so.       Immunizations and preventive care screenings were discussed with patient today. Appropriate education was printed on patient's after visit summary.    Counseling:  Continue Linzess for now, start amitriptyline         History of Present Illness     Adult Annual Physical:  Patient presents for annual physical.     Diet and Physical Activity:  - Diet/Nutrition: well balanced diet.    Depression Screening:  - PHQ-2 Score: 2    General Health:    - Hearing: normal hearing bilateral ears.  - Vision: vision problems, most recent eye exam > 1 year ago, wears contacts and wears glasses.  - Dental: regular dental visits.    /GYN Health:  - Follows with GYN: yes.   - Menopause: premenopausal.     Review of Systems   Constitutional:  Positive for  "fatigue. Negative for unexpected weight change.   Respiratory:  Negative for shortness of breath.    Cardiovascular:  Negative for chest pain and palpitations.   Gastrointestinal:  Positive for constipation.   Genitourinary:  Negative for difficulty urinating.   Musculoskeletal:  Positive for arthralgias and neck pain.   Neurological:  Negative for dizziness and headaches.         Objective     /80 (BP Location: Left arm, Patient Position: Sitting, Cuff Size: Adult)   Pulse 82   Temp (!) 97.3 °F (36.3 °C) (Probe)   Ht 5' 1.25\" (1.556 m)   Wt 56.4 kg (124 lb 6.4 oz)   SpO2 99%   BMI 23.31 kg/m²     Physical Exam  Constitutional:       General: She is not in acute distress.     Appearance: She is well-developed. She is not ill-appearing, toxic-appearing or diaphoretic.   HENT:      Right Ear: External ear normal. There is no impacted cerumen.      Left Ear: External ear normal. There is no impacted cerumen.   Eyes:      Conjunctiva/sclera: Conjunctivae normal.   Cardiovascular:      Rate and Rhythm: Normal rate and regular rhythm.      Heart sounds: Normal heart sounds. No murmur heard.  Pulmonary:      Effort: Pulmonary effort is normal. No respiratory distress.      Breath sounds: Normal breath sounds. No stridor. No wheezing or rales.   Abdominal:      General: There is no distension.      Palpations: Abdomen is soft. There is no mass.      Tenderness: There is no abdominal tenderness. There is no guarding or rebound.   Musculoskeletal:      Cervical back: Neck supple.      Right lower leg: No edema.      Left lower leg: No edema.   Neurological:      Mental Status: She is alert and oriented to person, place, and time.   Psychiatric:         Mood and Affect: Mood normal.         Behavior: Behavior normal.         Thought Content: Thought content normal.         Judgment: Judgment normal.         "

## 2024-10-24 ENCOUNTER — OFFICE VISIT (OUTPATIENT)
Dept: GASTROENTEROLOGY | Facility: CLINIC | Age: 34
End: 2024-10-24
Payer: COMMERCIAL

## 2024-10-24 VITALS
SYSTOLIC BLOOD PRESSURE: 124 MMHG | TEMPERATURE: 98.6 F | WEIGHT: 127 LBS | BODY MASS INDEX: 23.98 KG/M2 | HEIGHT: 61 IN | DIASTOLIC BLOOD PRESSURE: 78 MMHG

## 2024-10-24 DIAGNOSIS — K58.1 IRRITABLE BOWEL SYNDROME WITH CONSTIPATION: Primary | ICD-10-CM

## 2024-10-24 DIAGNOSIS — R14.0 BLOATING: ICD-10-CM

## 2024-10-24 PROCEDURE — 99243 OFF/OP CNSLTJ NEW/EST LOW 30: CPT | Performed by: PHYSICIAN ASSISTANT

## 2024-10-24 RX ORDER — SODIUM FLUORIDE 6 MG/ML
PASTE, DENTIFRICE DENTAL
COMMUNITY
Start: 2024-10-04

## 2024-10-24 NOTE — PATIENT INSTRUCTIONS
"Patient Education     Constipation in adults   The Basics   Written by the doctors and editors at [facility]   What is constipation? -- Constipation is a common problem that makes it hard to have bowel movements. Your bowel movements might be:   Too hard   Too small   Hard to get out   Happening fewer than 3 times a week  What causes constipation? -- Constipation can be caused by:   Side effects of some medicines   Poor diet   Diseases of the digestive system (figure 1)  What other symptoms should I watch for? -- These symptoms could signal a more serious problem:   Blood in the toilet or on the toilet paper after having a bowel movement   Fever   Weight loss   Feeling weak  It could also be a sign of a problem if you have new constipation without a change in your medicines or diet, and have never had constipation in the past.  Is there anything I can do on my own to get rid of constipation? -- Yes. Try these steps:   Eat foods that have a lot of fiber. Good choices are fruits, vegetables, prune juice, and cereal (table 1).   Drink plenty of water and other fluids.   When you feel the need to go to the bathroom, go to the bathroom. Don't hold it.   Take laxatives. These are medicines that help make bowel movements easier to get out. Some are pills that you swallow. Other medicines go into the rectum. These are called \"suppositories\" or \"enemas.\"  Should I see a doctor or nurse? -- See your doctor or nurse if:   Your symptoms are new or not normal for you.   You do not have a bowel movement for a few days.   The problem comes and goes, but lasts for longer than 3 weeks.   You are in a lot of pain.   You have other symptoms that also worry you (for example, bleeding, weakness, weight loss, or fever).   Other people in your family have had colorectal cancer or inflammatory bowel disease.  Should I have any tests? -- Your doctor or nurse will decide which tests you should have based on your age, other symptoms, and " "individual situation. There are lots of tests, but you might not need any.  Here are the most common tests doctors use to find the cause of constipation:   Rectal exam - Your doctor will look at the outside of your anus. They will also use a finger to feel inside the opening.   Sigmoidoscopy or colonoscopy - For these tests, the doctor puts a thin tube into your anus. Then, they advance the tube into your large intestine. The large intestine is also called the colon. The tube has a camera attached to it, so the doctor can look inside your intestines. During these tests, the doctor can also take samples of tissue to look at under a microscope (figure 2).   X-rays, CT scan, or MRI - These create images of the inside of your body.   Manometry studies - Manometry lets the doctor measure the pressure inside of the rectum at various points. It can help the doctor find out if the muscles that control bowel movements are working right. The test also shows whether the person's rectum can feel normally.  How is constipation treated? -- It depends on what is causing your constipation. First, your doctor will ask you to try eating more fiber and drinking more water. If that doesn't help, your doctor might suggest:   Medicines that you swallow or put in your rectum   Changing the medicines you are taking for other conditions   A treatment called an \"enema\" - Enemas can be just water, or they can contain medicine to help with constipation.   Biofeedback - This is a technique that teaches you to relax your muscles so you can let go and push bowel movements out.  Can constipation be prevented? -- You can reduce your chances of getting constipation again if you:   Eat a high-fiber diet (table 1).   Drink water and other fluids during the day,  oz of water daily.   Go to the bathroom at regular times every day.  All topics are updated as new evidence becomes available and our peer review process is complete.  This topic " "retrieved from [employer] on: Feb 26, 2024.  Topic 52725 Version 11.0  Release: 32.2.4 - C32.56  © 2024 UpToDate, Inc. and/or its affiliates. All rights reserved.  figure 1: Digestive system     This drawing shows the organs in the body that process food. Together, these organs are called the \"digestive system\" or \"digestive tract.\" As food travels through this system, the body absorbs nutrients and water.  Graphic 66164 Version 5.0  table 1: Amount of fiber in different foods  Food  Serving  Grams of fiber    Fruits    Apple (with skin) 1 medium apple 4.4   Banana 1 medium banana 3.1   Oranges 1 orange 3.1   Prunes 1 cup, pitted 12.4   Juices    Apple, unsweetened, with added ascorbic acid 1 cup 0.5   Grapefruit, white, canned, sweetened 1 cup 0.2   Grape, unsweetened, with added ascorbic acid 1 cup 0.5   Orange 1 cup 0.7   Vegetables    Cooked   Green beans 1 cup 4.0   Carrots 1/2 cup sliced 2.3   Peas 1 cup 8.8   Potato (baked, with skin) 1 medium potato 3.8   Raw   Cucumber (with peel) 1 cucumber 1.5   Lettuce 1 cup shredded 0.5   Tomato 1 medium tomato 1.5   Spinach 1 cup 0.7   Legumes   Baked beans, canned, no salt added 1 cup 13.9   Kidney beans, canned 1 cup 13.6   Lima beans, canned 1 cup 11.6   Lentils, boiled 1 cup 15.6   Breads, pastas, flours    Bran muffins 1 medium muffin 5.2   Oatmeal, cooked 1 cup 4.0   White bread 1 slice 0.6   Whole-wheat bread 1 slice 1.9   Pasta and rice, cooked   Macaroni 1 cup 2.5   Rice, brown 1 cup 3.5   Rice, white 1 cup 0.6   Spaghetti (regular) 1 cup 2.5   Nuts    Almonds 1/2 cup 8.7   Peanuts 1/2 cup 7.9   To learn how much fiber and other nutrients are in different foods, visit the [place] Department of Agriculture (USDA) FoodData Central website.  Graphic 84961 Version 6.0  figure 2: Colonoscopy     During a colonoscopy, you lie on your side and the doctor puts a thin tube with a camera into your anus (from behind). Then, the doctor advances the tube into the " rectum and colon. The camera sends pictures from inside your colon to a television screen.  Graphic 82857 Version 8.0  Consumer Information Use and Disclaimer   Disclaimer: This generalized information is a limited summary of diagnosis, treatment, and/or medication information. It is not meant to be comprehensive and should be used as a tool to help the user understand and/or assess potential diagnostic and treatment options. It does NOT include all information about conditions, treatments, medications, side effects, or risks that may apply to a specific patient. It is not intended to be medical advice or a substitute for the medical advice, diagnosis, or treatment of a health care provider based on the health care provider's examination and assessment of a patient's specific and unique circumstances. Patients must speak with a health care provider for complete information about their health, medical questions, and treatment options, including any risks or benefits regarding use of medications. This information does not endorse any treatments or medications as safe, effective, or approved for treating a specific patient. UpToDate, Inc. and its affiliates disclaim any warranty or liability relating to this information or the use thereof.The use of this information is governed by the Terms of Use, available at https://www.woltersDialogicuwer.com/en/know/clinical-effectiveness-terms. 2024© UpToDate, Inc. and its affiliates and/or licensors. All rights reserved.  Copyright   © 2024 UpToDate, Inc. and/or its affiliates. All rights reserved.

## 2024-10-24 NOTE — PROGRESS NOTES
Ambulatory Visit  Name: Theresa Borden      : 1990      MRN: [id number]  Encounter Provider: Paula Vieyra PA-C  Encounter Date: 10/24/2024   Encounter department: [facility] [place]    Assessment & Plan  Irritable bowel syndrome with constipation  Agree symptoms consistent with IBS-C with overflow diarrhea. She denies alarm symptoms. Previous labs negative for celiac disease, thyroid disease, and she had negative colonoscopy with TI intubation in .    Check KUB to assess stool burden. If large amount of stool, would recommend bowel prep to clean out then start higher dose Linzess 290 mcg daily. For now, continue Linzess 145 mcg daily. Agree with continuing amitriptyline 10 mg daily-explained it takes 4 to 6 weeks to see full effect of the medication. After that point, we can consider dose increase. Discussed importance of high-fiber diet and adequate fluid intake 80 to 100 ounces daily.    Orders:    Ambulatory Referral to Gastroenterology    XR abdomen 1 view kub; Future    Small intestinal bacterial overgrowth    Bloating  Her severe bloating is likely secondary to IBS-C however we will check breath test to rule out superimposed SIBO/IMO. If positive, will treat with antibiotics.    Orders:    XR abdomen 1 view kub; Future    Small intestinal bacterial overgrowth    Follow-up in 3 months    History of Present Illness     Theresa Borden is a 34 y.o. female with history of IBS referred by PCP     She reports history of irritable bowel syndrome and was previously seen at [facility] in [place].  She has had blood testing negative for celiac disease and had negative colonoscopy with TI intubation in  at [facility]. I personally reviewed this report.  She also had a hemorrhoidectomy at that time.  She struggles with chronic constipation, having bowel movements every 2 to 4 days then overflow diarrhea.  She constantly feels bloated like  "there is a bowling ball in her abdomen. She also has abdominal cramping and urgency when she does finally need to have a bowel movement.  She denies any blood in the stool or abnormal weight loss. No upper GI complaints. She says she mainly eats TV dinners and fast food, since she lives alone. She tried Metamucil in the past but felt this made her more constipated. She is currently on Linzess 145 mcg daily but still struggles with alternating constipation and diarrhea. She was also just started on amitriptyline 10 mg daily about 3 weeks ago. She has not noticed a big difference yet.      History obtained from : patient  Review of Systems   Constitutional:  Negative for chills and fever.   HENT:  Negative for ear pain and sore throat.    Eyes:  Negative for pain and visual disturbance.   Respiratory:  Negative for cough and shortness of breath.    Cardiovascular:  Negative for chest pain and palpitations.   Gastrointestinal:  Positive for abdominal pain and constipation. Negative for vomiting.   Genitourinary:  Negative for dysuria and hematuria.   Musculoskeletal:  Negative for arthralgias and back pain.   Skin:  Negative for color change and rash.   Neurological:  Negative for seizures and syncope.   All other systems reviewed and are negative.          Objective     /78 (BP Location: Left arm, Patient Position: Sitting, Cuff Size: Adult)   Temp 98.6 °F (37 °C) (Tympanic)   Ht 5' 1.25\" (1.556 m)   Wt 57.6 kg (127 lb)   BMI 23.80 kg/m²     Physical Exam  Vitals and nursing note reviewed.   Constitutional:       General: She is not in acute distress.     Appearance: She is well-developed.   HENT:      Head: Normocephalic and atraumatic.   Eyes:      Conjunctiva/sclera: Conjunctivae normal.   Cardiovascular:      Rate and Rhythm: Normal rate and regular rhythm.      Heart sounds: No murmur heard.  Pulmonary:      Effort: Pulmonary effort is normal. No respiratory distress.      Breath sounds: Normal breath " sounds.   Abdominal:      Palpations: Abdomen is soft.      Tenderness: There is no abdominal tenderness.   Musculoskeletal:         General: No swelling.      Cervical back: Neck supple.   Skin:     General: Skin is warm and dry.   Neurological:      Mental Status: She is alert.   Psychiatric:         Mood and Affect: Mood normal.       Administrative Statements   I have spent a total time of 30 minutes in caring for this patient on the day of the visit/encounter including Diagnostic results, Prognosis, Risks and benefits of tx options, Instructions for management, Patient and family education, Importance of tx compliance, Risk factor reductions, Impressions, Counseling / Coordination of care, Documenting in the medical record, Reviewing / ordering tests, medicine, procedures  , and Obtaining or reviewing history  .

## 2024-10-28 ENCOUNTER — HOSPITAL ENCOUNTER (OUTPATIENT)
Dept: NON INVASIVE DIAGNOSTICS | Facility: HOSPITAL | Age: 34
Discharge: HOME | End: 2024-10-28
Payer: COMMERCIAL

## 2024-10-28 DIAGNOSIS — I10 PRIMARY HYPERTENSION: ICD-10-CM

## 2024-10-28 PROCEDURE — 93975 VASCULAR STUDY: CPT

## 2024-10-28 PROCEDURE — 93975 VASCULAR STUDY: CPT | Performed by: SURGERY

## 2024-11-02 ENCOUNTER — APPOINTMENT (OUTPATIENT)
Dept: RADIOLOGY | Age: 34
End: 2024-11-02
Payer: COMMERCIAL

## 2024-11-02 DIAGNOSIS — R14.0 BLOATING: ICD-10-CM

## 2024-11-02 DIAGNOSIS — K58.1 IRRITABLE BOWEL SYNDROME WITH CONSTIPATION: ICD-10-CM

## 2024-11-02 PROCEDURE — 74018 RADEX ABDOMEN 1 VIEW: CPT

## 2024-11-04 ENCOUNTER — TELEPHONE (OUTPATIENT)
Dept: GASTROENTEROLOGY | Facility: CLINIC | Age: 34
End: 2024-11-04

## 2024-11-04 DIAGNOSIS — K58.1 IRRITABLE BOWEL SYNDROME WITH CONSTIPATION: Primary | ICD-10-CM

## 2024-11-04 DIAGNOSIS — K59.09 CHRONIC CONSTIPATION: ICD-10-CM

## 2024-11-04 RX ORDER — LINACLOTIDE 145 UG/1
145 CAPSULE, GELATIN COATED ORAL DAILY
Qty: 90 CAPSULE | Refills: 3 | Status: SHIPPED | OUTPATIENT
Start: 2024-11-04

## 2024-11-06 ENCOUNTER — OFFICE VISIT (OUTPATIENT)
Dept: GASTROENTEROLOGY | Facility: CLINIC | Age: 34
End: 2024-11-06
Payer: COMMERCIAL

## 2024-11-06 DIAGNOSIS — R14.0 BLOATING: Primary | ICD-10-CM

## 2024-11-06 DIAGNOSIS — K58.1 IRRITABLE BOWEL SYNDROME WITH CONSTIPATION: ICD-10-CM

## 2024-11-06 PROCEDURE — PBNCHG PB NO CHARGE PLACEHOLDER: Performed by: INTERNAL MEDICINE

## 2024-11-06 PROCEDURE — 91065 BREATH HYDROGEN/METHANE TEST: CPT | Performed by: INTERNAL MEDICINE

## 2024-11-06 NOTE — PROGRESS NOTES
[facility]       Bacterial Overgrowth Analytical Record    Theresa Borden 34 y.o. female MRN: [id number]      Date of Test: 11/03/2024    Substrate Given: Lactulose    Ordering Provider: Paula Vieyra PA-C    Medical Assistant: Mouna Vargas    Symptoms: bloating, IBS/C    The patient presents for bacterial overgrowth testing.    Patient fasted overnight. Baseline readings obtained.   Breath test performed every 20 min for a total of 3 hr    Sample Clock Time ppmH2 ppmCH4 Co2% John   Baseline   8:30   3   6   4.3   1.27   #1  20 minutes   8:50   10   10   3.7   1.48   #2  40 minutes   9:10   6   8   3.6   1.52   #3  60 minutes   9:30   12   10   3.8   1.44   #4  80 minutes   9:50   19   11   3.5   1.57   #5  100 minutes   10:10   31   13   3.7   1.48   #6  120 minutes   10:30   40   15   3.6   1.52   #7  140 minutes   10:50   30   12   3.6   1.52   #8  160 minutes   11:10   34   13   3.9   1.41   #9  180 minutes   11:30   25   11   4.3   1.27       Physician interpretation: Test is positive for SIBO and intestinal methanogen over growth.  Defer management to primary GI provider Paula Vieyra.

## 2024-11-08 DIAGNOSIS — K58.9 IRRITABLE BOWEL SYNDROME, UNSPECIFIED TYPE: Primary | ICD-10-CM

## 2024-11-08 DIAGNOSIS — K63.829 INTESTINAL METHANOGEN OVERGROWTH: ICD-10-CM

## 2024-11-08 DIAGNOSIS — K63.8219 SMALL INTESTINAL BACTERIAL OVERGROWTH (SIBO): ICD-10-CM

## 2024-11-08 RX ORDER — NEOMYCIN SULFATE 500 MG/1
500 TABLET ORAL 2 TIMES DAILY
Qty: 28 TABLET | Refills: 0 | Status: SHIPPED | OUTPATIENT
Start: 2024-11-08 | End: 2024-11-22

## 2024-11-11 ENCOUNTER — TRANSCRIBE ORDERS (OUTPATIENT)
Dept: GASTROENTEROLOGY | Facility: CLINIC | Age: 34
End: 2024-11-11

## 2024-11-12 ENCOUNTER — TELEPHONE (OUTPATIENT)
Dept: GASTROENTEROLOGY | Facility: CLINIC | Age: 34
End: 2024-11-12

## 2024-11-12 ENCOUNTER — TRANSCRIBE ORDERS (OUTPATIENT)
Dept: GASTROENTEROLOGY | Facility: CLINIC | Age: 34
End: 2024-11-12

## 2024-11-12 DIAGNOSIS — K63.8219 SMALL INTESTINAL BACTERIAL OVERGROWTH (SIBO): ICD-10-CM

## 2024-11-12 DIAGNOSIS — K58.9 IRRITABLE BOWEL SYNDROME, UNSPECIFIED TYPE: ICD-10-CM

## 2024-11-12 NOTE — TELEPHONE ENCOUNTER
Reason for call:   [] Refill   [x] Prior Auth  [] Other:     Office:   [] PCP/Provider -   [x] Specialty/Provider - Paula Stearns    Medication: Xifaxan    Dose/Frequency: 500 mg TID     Quantity: 42    Pharmacy: [facility] [place] [address]

## 2024-11-12 NOTE — TELEPHONE ENCOUNTER
PA for Xifaxan 550 mg  SUBMITTED to [facility] [employer]    via    []CMM-KEY:   [x]Surescripts-Case ID #     [id number]    Payer: [facility] [employer]   Phone: [phone]   Fax: [phone]     []Availity-Auth ID # NDC #   []Faxed to plan   []Other website   []Phone call Case ID #     []PA sent as URGENT    All office notes, labs and other pertaining documents and studies sent. Clinical questions answered. Awaiting determination from insurance company.     Turnaround time for your insurance to make a decision on your Prior Authorization can take 7-21 business days.

## 2024-11-15 ENCOUNTER — TRANSCRIBE ORDERS (OUTPATIENT)
Dept: GASTROENTEROLOGY | Facility: CLINIC | Age: 34
End: 2024-11-15

## 2024-11-18 NOTE — TELEPHONE ENCOUNTER
Approved    Prior authorization approved  Payer: [employer] Case ID: [id number]    [phone]  Note from payer: The case has been Approved from  [id number] to [id number]  Approval Details    Authorized from November 12, 2024 to November 12, 2025  Electronic appeal: Not supported  View History  Medication Being Authorized    rifaximin (XIFAXAN) 550 mg tablet  Take 1 tablet (550 mg total) by mouth 3 (three) times a day for 14 days  Dispense: 42 tablet Refills: 0   Start: 11/8/2024 End: 11/22/2024   Class: Normal Diagnoses: Irritable bowel syndrome, unspecified type; Small intestinal bacterial overgrowth (SIBO)   This order has been released to its destination.  To be filled at: [id number] - [place], [place] - [address]

## 2024-12-29 DIAGNOSIS — K58.1 IRRITABLE BOWEL SYNDROME WITH CONSTIPATION: ICD-10-CM

## 2024-12-30 RX ORDER — AMITRIPTYLINE HYDROCHLORIDE 10 MG/1
10 TABLET ORAL
Qty: 30 TABLET | Refills: 5 | Status: SHIPPED | OUTPATIENT
Start: 2024-12-30

## 2025-01-21 DIAGNOSIS — Z01.419 WOMEN'S ANNUAL ROUTINE GYNECOLOGICAL EXAMINATION: ICD-10-CM

## 2025-01-21 DIAGNOSIS — Z30.41 ENCOUNTER FOR SURVEILLANCE OF CONTRACEPTIVE PILLS: ICD-10-CM

## 2025-01-21 RX ORDER — NORGESTIMATE AND ETHINYL ESTRADIOL 7DAYSX3 LO
1 KIT ORAL DAILY
Qty: 90 TABLET | Refills: 2 | Status: SHIPPED | OUTPATIENT
Start: 2025-01-21

## 2025-01-21 NOTE — TELEPHONE ENCOUNTER
Patient will be out starting Saturday and switching to Dr Ureña due to Dr Riedel retired. Thank you

## 2025-01-30 ENCOUNTER — OFFICE VISIT (OUTPATIENT)
Dept: GASTROENTEROLOGY | Facility: CLINIC | Age: 35
End: 2025-01-30
Payer: COMMERCIAL

## 2025-01-30 VITALS
BODY MASS INDEX: 24.55 KG/M2 | TEMPERATURE: 97.4 F | WEIGHT: 130 LBS | DIASTOLIC BLOOD PRESSURE: 88 MMHG | HEIGHT: 61 IN | SYSTOLIC BLOOD PRESSURE: 118 MMHG

## 2025-01-30 DIAGNOSIS — K63.8219 SMALL INTESTINAL BACTERIAL OVERGROWTH (SIBO): ICD-10-CM

## 2025-01-30 DIAGNOSIS — K58.1 IRRITABLE BOWEL SYNDROME WITH CONSTIPATION: Primary | ICD-10-CM

## 2025-01-30 PROCEDURE — 99213 OFFICE O/P EST LOW 20 MIN: CPT | Performed by: PHYSICIAN ASSISTANT

## 2025-01-30 RX ORDER — AMITRIPTYLINE HYDROCHLORIDE 10 MG/1
30 TABLET ORAL
Qty: 270 TABLET | Refills: 0 | Status: SHIPPED | OUTPATIENT
Start: 2025-01-30 | End: 2025-04-30

## 2025-01-30 NOTE — PROGRESS NOTES
Name: Theresa Borden      : 1990      MRN: [id number]  Encounter Provider: Paula Vieyra PA-C  Encounter Date: 2025   Encounter department: [facility] [place]  :  Assessment & Plan  Irritable bowel syndrome with constipation  Symptoms consistent with IBS-C. No alarm symptoms. Previous labs negative for celiac disease, thyroid disease, and she had negative colonoscopy with TI intubation in . Recent KUB showed mild constipation on Linzess 145 mcg daily.     Given her ongoing abdominal pain, we will increase amitriptyline to 30 mg daily at bedtime.  Discussed importance of high-fiber diet and adequate fluid intake 80 to 100 ounces daily.    We may increase Linzess to 290 mcg daily at her next visit, if constipation continues to be an issue.     Orders:    amitriptyline (ELAVIL) 10 mg tablet; Take 3 tablets (30 mg total) by mouth daily at bedtime    Small intestinal bacterial overgrowth (SIBO)  She tested positive for SIBO/IMO and was treated with rifaximin and neomycin for 2 weeks.  Symptoms improved for 1 to 2 weeks, but rapidly returned. I do not recommend retreating with antibiotics given her only temporary improvement.       Follow-up in 4 months    History of Present Illness   HPI  Theresa Borden is a 34 y.o. female who presents for history of IBS-C.    History obtained from: patient    She is currently taking amitriptyline 10 mg daily at bedtime.  She states that her abdominal pain is unchanged.  No better, and no worse.  She still has irregular bowel movements, sometimes urgency with stools.  She is taking Linzess 145 mcg daily.    She reports history of irritable bowel syndrome and was previously seen at [place] and [facility] in [place].  She has had blood testing negative for celiac disease and had negative colonoscopy with TI intubation in  at [facility]. I personally reviewed this report.  She also had a hemorrhoidectomy at that  "time.       Objective   /88 (BP Location: Right arm, Patient Position: Sitting, Cuff Size: Adult)   Temp (!) 97.4 °F (36.3 °C) (Tympanic)   Ht 5' 1\" (1.549 m)   Wt 59 kg (130 lb)   BMI 24.56 kg/m²      Physical Exam  Vitals and nursing note reviewed.   Constitutional:       General: She is not in acute distress.     Appearance: She is well-developed.   HENT:      Head: Normocephalic and atraumatic.   Eyes:      Conjunctiva/sclera: Conjunctivae normal.   Cardiovascular:      Rate and Rhythm: Normal rate and regular rhythm.      Heart sounds: No murmur heard.  Pulmonary:      Effort: Pulmonary effort is normal. No respiratory distress.      Breath sounds: Normal breath sounds.   Abdominal:      Palpations: Abdomen is soft.      Tenderness: There is abdominal tenderness.      Comments: Mild generalized abdominal tenderness   Musculoskeletal:         General: No swelling.      Cervical back: Neck supple.   Skin:     General: Skin is warm and dry.      Capillary Refill: Capillary refill takes less than 2 seconds.   Neurological:      Mental Status: She is alert.   Psychiatric:         Mood and Affect: Mood normal.           "

## 2025-02-11 ENCOUNTER — OFFICE VISIT (OUTPATIENT)
Dept: INTERNAL MEDICINE CLINIC | Facility: CLINIC | Age: 35
End: 2025-02-11
Payer: COMMERCIAL

## 2025-02-11 VITALS
WEIGHT: 128 LBS | HEIGHT: 61 IN | SYSTOLIC BLOOD PRESSURE: 122 MMHG | HEART RATE: 96 BPM | BODY MASS INDEX: 24.17 KG/M2 | OXYGEN SATURATION: 98 % | DIASTOLIC BLOOD PRESSURE: 72 MMHG

## 2025-02-11 DIAGNOSIS — I10 PRIMARY HYPERTENSION: ICD-10-CM

## 2025-02-11 DIAGNOSIS — Z13.220 SCREENING, LIPID: ICD-10-CM

## 2025-02-11 DIAGNOSIS — K59.09 CHRONIC CONSTIPATION: Primary | ICD-10-CM

## 2025-02-11 DIAGNOSIS — E55.9 VITAMIN D DEFICIENCY: ICD-10-CM

## 2025-02-11 DIAGNOSIS — F10.21 ALCOHOL DEPENDENCE IN REMISSION (HCC): ICD-10-CM

## 2025-02-11 PROCEDURE — 99213 OFFICE O/P EST LOW 20 MIN: CPT | Performed by: INTERNAL MEDICINE

## 2025-02-11 RX ORDER — LINACLOTIDE 145 UG/1
145 CAPSULE, GELATIN COATED ORAL DAILY
Qty: 90 CAPSULE | Refills: 3 | Status: SHIPPED | OUTPATIENT
Start: 2025-02-11

## 2025-02-11 NOTE — ASSESSMENT & PLAN NOTE
Controlled on labetalol  Orders:  •  CBC  •  TSH, 3rd generation with Free T4 reflex  •  Basic metabolic panel

## 2025-02-11 NOTE — PROGRESS NOTES
Name: Theresa Borden      : 1990      MRN: [id number]  Encounter Provider: Lea Reyes, MD  Encounter Date: 2025   Encounter department: [facility]    Assessment & Plan  Chronic constipation    Orders:  •  linaCLOtide (Linzess) 145 MCG CAPS; Take 1 capsule (145 mcg total) by mouth in the morning  •  TSH, 3rd generation with Free T4 reflex    Primary hypertension  Controlled on labetalol  Orders:  •  CBC  •  TSH, 3rd generation with Free T4 reflex  •  Basic metabolic panel    Alcohol dependence in remission (HCC)  Remains in remission       Vitamin D deficiency  Taking 2000 IU daily  Orders:  •  Vitamin D 25 hydroxy    Screening, lipid    Orders:  •  Lipid panel         History of Present Illness     Here for follow-up  Chronic constipation/IBS C on Linzess 145 mcg and started amitriptyline back in October  No real significant improvement of constipation and chronic abdominal bloating  She has seen gastroenterology and treated with 2 weeks of rifaximin for SIBO with some improvement but symptoms returned soon after stopping it  Amitriptyline raised to 30 mg about a week and a half ago  She was also experiencing a lot of anxiety with her mother receiving stem cell transplant.  Her mother has been back home and doing well so her anxiety has improved  Complains of feeling very tired all the time          Review of Systems   Constitutional:  Positive for fatigue. Negative for unexpected weight change.   Respiratory:  Negative for shortness of breath.    Cardiovascular:  Positive for palpitations (With anxiety). Negative for chest pain.   Gastrointestinal:  Positive for abdominal distention, abdominal pain and constipation. Negative for blood in stool.   Psychiatric/Behavioral:  The patient is nervous/anxious.      Past Medical History:   Diagnosis Date   • Alcoholism (HCC)     Been sober 6 1/2 years now   • Allergic     Seasonal allergies   • Chlamydia    • Environmental allergies    •  Herpes      believes initial test was a false positive as retesting was negative   • Hypertension    • IBS (irritable bowel syndrome) 06/01/2021   • Inflammatory bowel disease    • Stomach problems    • Substance abuse (HCC)     Alcoholic - sober 6 years   • Tick bite of right front wall of thorax 05/21/2024   • Varicella     As a child     Past Surgical History:   Procedure Laterality Date   • COLONOSCOPY N/A 2014   • HEMORRHOID SURGERY  03/03/2021   • MOUTH SURGERY N/A 2007    7 teeth pulled before braces were put on     Family History   Problem Relation Age of Onset   • Hypertension Mother    • Hyperlipidemia Mother    • Diabetes Mother    • Cancer Mother         Active multiple myloma   • Multiple myeloma Mother    • Diabetes Father    • Colon cancer Father    • Hypertension Father    • Cancer Maternal Grandmother         Bile duct cancer; lung cancer   • Diabetes Maternal Grandmother    • Dementia Maternal Grandfather    • Hearing loss Maternal Grandfather    • Diabetes Paternal Grandmother    • Alcohol abuse Paternal Grandfather    • Alcohol abuse Maternal Aunt    • Mental illness Maternal Aunt         Bipolar   • Depression Maternal Aunt    • Bipolar disorder Maternal Aunt    • Self-Injury Maternal Aunt    • Suicide Attempts Maternal Aunt    • Cancer Family    • Diabetes Family    • Hypertension Family      Social History     Tobacco Use   • Smoking status: Never   • Smokeless tobacco: Never   Vaping Use   • Vaping status: Never Used   Substance and Sexual Activity   • Alcohol use: Not Currently     Comment: Sober 6 years   • Drug use: No   • Sexual activity: Yes     Partners: Male     Birth control/protection: OCP     Current Outpatient Medications on File Prior to Visit   Medication Sig   • amitriptyline (ELAVIL) 10 mg tablet Take 3 tablets (30 mg total) by mouth daily at bedtime   • cholecalciferol (VITAMIN D3) 1,000 units tablet Take 1 tablet (1,000 Units total) by mouth daily   • Ibuprofen 200 MG CAPS  "Take by mouth   • labetalol (NORMODYNE) 100 mg tablet Take 1 tablet (100 mg total) by mouth 2 (two) times a day   • loratadine (CLARITIN) 10 mg tablet Take 10 mg by mouth daily   • Norgestimate-Eth Estradiol (Tri-Lo-Rolanda) 0.18/0.215/0.25 MG-25 MCG TABS Take 1 tablet by mouth daily   • PreviDent 5000 Booster Plus 1.1 % PSTE    • [DISCONTINUED] linaCLOtide (Linzess) 145 MCG CAPS Take 1 capsule (145 mcg total) by mouth in the morning   • BORIC ACID EX Insert into the vagina (Patient not taking: Reported on 8/7/2024)     No Known Allergies  Immunization History   Administered Date(s) Administered   • COVID-19 PFIZER VACCINE 0.3 ML IM 04/08/2021   • Tdap 03/26/2024     Objective   /72 (BP Location: Left arm, Patient Position: Sitting, Cuff Size: Standard)   Pulse 96   Ht 5' 1\" (1.549 m)   Wt 58.1 kg (128 lb)   SpO2 98%   BMI 24.19 kg/m²     Physical Exam  Constitutional:       General: She is not in acute distress.     Appearance: She is well-developed. She is not ill-appearing, toxic-appearing or diaphoretic.   Eyes:      Conjunctiva/sclera: Conjunctivae normal.   Cardiovascular:      Rate and Rhythm: Normal rate and regular rhythm.      Heart sounds: Normal heart sounds. No murmur heard.  Pulmonary:      Effort: Pulmonary effort is normal. No respiratory distress.      Breath sounds: Normal breath sounds. No stridor. No wheezing or rales.   Abdominal:      General: There is distension.      Palpations: Abdomen is soft. There is no mass.      Tenderness: There is abdominal tenderness. There is no guarding or rebound.   Neurological:      Mental Status: She is alert and oriented to person, place, and time.   Psychiatric:         Mood and Affect: Mood is anxious.         Behavior: Behavior normal.         Thought Content: Thought content normal.         Judgment: Judgment normal.         "

## 2025-02-18 LAB
25(OH)D3 SERPL-MCNC: 26 NG/ML (ref 30–100)
BUN SERPL-MCNC: 10 MG/DL (ref 7–25)
BUN/CREAT SERPL: NORMAL (CALC) (ref 6–22)
CALCIUM SERPL-MCNC: 9.4 MG/DL (ref 8.6–10.2)
CHLORIDE SERPL-SCNC: 106 MMOL/L (ref 98–110)
CHOLEST SERPL-MCNC: 235 MG/DL
CHOLEST/HDLC SERPL: 2.9 (CALC)
CO2 SERPL-SCNC: 27 MMOL/L (ref 20–32)
CREAT SERPL-MCNC: 0.68 MG/DL (ref 0.5–0.97)
ERYTHROCYTE [DISTWIDTH] IN BLOOD BY AUTOMATED COUNT: 12.3 % (ref 11–15)
GFR/BSA.PRED SERPLBLD CYS-BASED-ARV: 116 ML/MIN/1.73M2
GLUCOSE SERPL-MCNC: 84 MG/DL (ref 65–99)
HCT VFR BLD AUTO: 37.8 % (ref 35–45)
HDLC SERPL-MCNC: 82 MG/DL
HGB BLD-MCNC: 12.7 G/DL (ref 11.7–15.5)
LDLC SERPL CALC-MCNC: 126 MG/DL (CALC)
MCH RBC QN AUTO: 29.7 PG (ref 27–33)
MCHC RBC AUTO-ENTMCNC: 33.6 G/DL (ref 32–36)
MCV RBC AUTO: 88.5 FL (ref 80–100)
NONHDLC SERPL-MCNC: 153 MG/DL (CALC)
PLATELET # BLD AUTO: 376 THOUSAND/UL (ref 140–400)
PMV BLD REES-ECKER: 9.5 FL (ref 7.5–12.5)
POTASSIUM SERPL-SCNC: 4.8 MMOL/L (ref 3.5–5.3)
RBC # BLD AUTO: 4.27 MILLION/UL (ref 3.8–5.1)
SODIUM SERPL-SCNC: 141 MMOL/L (ref 135–146)
TRIGL SERPL-MCNC: 157 MG/DL
TSH SERPL-ACNC: 1.43 MIU/L
WBC # BLD AUTO: 7.2 THOUSAND/UL (ref 3.8–10.8)

## 2025-02-23 ENCOUNTER — RESULTS FOLLOW-UP (OUTPATIENT)
Dept: INTERNAL MEDICINE CLINIC | Facility: CLINIC | Age: 35
End: 2025-02-23

## 2025-04-28 DIAGNOSIS — K58.1 IRRITABLE BOWEL SYNDROME WITH CONSTIPATION: ICD-10-CM

## 2025-04-29 RX ORDER — AMITRIPTYLINE HYDROCHLORIDE 10 MG/1
TABLET ORAL
Qty: 270 TABLET | Refills: 1 | Status: SHIPPED | OUTPATIENT
Start: 2025-04-29

## 2025-06-23 DIAGNOSIS — I10 PRIMARY HYPERTENSION: ICD-10-CM

## 2025-06-24 RX ORDER — LABETALOL 100 MG/1
100 TABLET, FILM COATED ORAL 2 TIMES DAILY
Qty: 180 TABLET | Refills: 1 | Status: SHIPPED | OUTPATIENT
Start: 2025-06-24

## 2025-07-01 ENCOUNTER — TELEPHONE (OUTPATIENT)
Age: 35
End: 2025-07-01

## 2025-07-01 NOTE — TELEPHONE ENCOUNTER
FYI:    Pt called back stating her father read the TB results wrong, he DOES NOT HAVE TB.     Please disregard the previous msg.     NFA

## 2025-07-01 NOTE — TELEPHONE ENCOUNTER
Patient calling to inform provider that they found out her dad just tested positive for tuberculosis. Patient reports her dad is completely asymptomatic as is the patient. Patient is wondering since she has been around her father if there is anything that she should be doing or any tests she should be going for. Please advise.

## 2025-07-02 ENCOUNTER — OFFICE VISIT (OUTPATIENT)
Dept: GASTROENTEROLOGY | Facility: CLINIC | Age: 35
End: 2025-07-02
Payer: COMMERCIAL

## 2025-07-02 VITALS
WEIGHT: 130 LBS | SYSTOLIC BLOOD PRESSURE: 120 MMHG | TEMPERATURE: 98.4 F | BODY MASS INDEX: 24.55 KG/M2 | HEIGHT: 61 IN | DIASTOLIC BLOOD PRESSURE: 76 MMHG

## 2025-07-02 DIAGNOSIS — R10.13 DYSPEPSIA: Primary | ICD-10-CM

## 2025-07-02 DIAGNOSIS — K63.8219 SMALL INTESTINAL BACTERIAL OVERGROWTH (SIBO): ICD-10-CM

## 2025-07-02 DIAGNOSIS — K58.1 IRRITABLE BOWEL SYNDROME WITH CONSTIPATION: ICD-10-CM

## 2025-07-02 PROCEDURE — 99213 OFFICE O/P EST LOW 20 MIN: CPT | Performed by: PHYSICIAN ASSISTANT

## 2025-07-02 NOTE — PATIENT INSTRUCTIONS
You can try a magnesium supplement such as magnesium oxide 250-500 mg daily for bloating/constipation  You can also try eating kiwi or prunes/prune juice for constipation    The patient will call to schedule Gastric Emptying Study

## 2025-07-02 NOTE — ASSESSMENT & PLAN NOTE
She tested positive for SIBO/IMO and was treated with rifaximin and neomycin for 2 weeks. Symptoms improved for 1 to 2 weeks, but rapidly returned. I do not recommend retreating with antibiotics given her only temporary improvement.

## 2025-07-02 NOTE — ASSESSMENT & PLAN NOTE
Stable. No alarm symptoms. Previous labs negative for celiac disease, thyroid disease, and she had negative colonoscopy with TI intubation in 2021. Previous x-ray showed mild constipation.    Continue Linzess 145 mcg daily before breakfast. She would like to continue her current dose of amitriptyline 30 mg daily, since this has improved her abdominal pain. Continue high-fiber diet and adequate fluid intake. Discussed incorporating magnesium oxide supplement daily, or kiwi or prunes/prune juice.

## 2025-07-02 NOTE — PROGRESS NOTES
Name: Theresa Borden      : 1990      MRN: [id number]  Encounter Provider: Paula Vieyra PA-C  Encounter Date: 2025   Encounter department: [facility] [place]  :  Assessment & Plan  Dyspepsia  Check gastric emptying study due to complaints of postprandial bloating and fullness  Check H. pylori stool antigen and treat if positive  Discussed importance of small, frequent meals and low-fat diet    Orders:    NM gastric emptying; Future    Irritable bowel syndrome with constipation  Stable. No alarm symptoms. Previous labs negative for celiac disease, thyroid disease, and she had negative colonoscopy with TI intubation in . Previous x-ray showed mild constipation.    Continue Linzess 145 mcg daily before breakfast. She would like to continue her current dose of amitriptyline 30 mg daily, since this has improved her abdominal pain. Continue high-fiber diet and adequate fluid intake. Discussed incorporating magnesium oxide supplement daily, or kiwi or prunes/prune juice.          Small intestinal bacterial overgrowth (SIBO)  She tested positive for SIBO/IMO and was treated with rifaximin and neomycin for 2 weeks. Symptoms improved for 1 to 2 weeks, but rapidly returned. I do not recommend retreating with antibiotics given her only temporary improvement.        Follow-up in 6 months    History of Present Illness   HPI  Theresa Borden is a 35 y.o. female who presents for follow-up of IBS-C.    History obtained from: patient    She does report improvement in abdominal pain on the higher dose of amitriptyline.  She still has irregular bowel movements at times with urgency and loose stools. She is afraid to increase the dose of Linzess due to concern for unpredictability of bowel movements. Her weight has increased by a few pounds over the past year. She continues to have postprandial bloating and fullness. No reflux complaints.    She was previously seen at [place],  [facility], and [facility] in [place].  She has had blood testing negative for celiac disease and had negative colonoscopy with TI intubation in 2021 at [facility]. I personally reviewed this report. She also had a hemorrhoidectomy at that time.        Objective   /76 (BP Location: Right arm, Patient Position: Sitting, Cuff Size: Adult)   Temp 98.4 °F (36.9 °C) (Tympanic)   Ht 5' 1\" (1.549 m)   Wt 59 kg (130 lb)   BMI 24.56 kg/m²      Physical Exam  Vitals and nursing note reviewed.   Constitutional:       General: She is not in acute distress.     Appearance: She is well-developed.   HENT:      Head: Normocephalic and atraumatic.     Eyes:      Conjunctiva/sclera: Conjunctivae normal.       Cardiovascular:      Rate and Rhythm: Normal rate and regular rhythm.      Heart sounds: No murmur heard.  Pulmonary:      Effort: Pulmonary effort is normal. No respiratory distress.      Breath sounds: Normal breath sounds.   Abdominal:      Palpations: Abdomen is soft.      Tenderness: There is no abdominal tenderness.     Musculoskeletal:         General: No swelling.      Cervical back: Neck supple.     Skin:     General: Skin is warm and dry.     Neurological:      Mental Status: She is alert.     Psychiatric:         Mood and Affect: Mood normal.           "

## 2025-08-06 ENCOUNTER — HOSPITAL ENCOUNTER (OUTPATIENT)
Dept: NON INVASIVE DIAGNOSTICS | Facility: CLINIC | Age: 35
Discharge: HOME | End: 2025-08-06
Attending: PHYSICIAN ASSISTANT
Payer: COMMERCIAL

## 2025-08-06 ENCOUNTER — RESULTS FOLLOW-UP (OUTPATIENT)
Dept: GASTROENTEROLOGY | Facility: CLINIC | Age: 35
End: 2025-08-06

## 2025-08-06 DIAGNOSIS — R10.13 DYSPEPSIA: ICD-10-CM

## 2025-08-06 PROCEDURE — 78264 GASTRIC EMPTYING IMG STUDY: CPT

## 2025-08-06 PROCEDURE — A9541 TC99M SULFUR COLLOID: HCPCS
